# Patient Record
Sex: FEMALE | Race: WHITE
[De-identification: names, ages, dates, MRNs, and addresses within clinical notes are randomized per-mention and may not be internally consistent; named-entity substitution may affect disease eponyms.]

---

## 2018-02-06 NOTE — EDPHY
H & P


Time Seen by Provider: 02/06/18 15:46


HPI/ROS: 





HPI


Chest pain.





67-year-old female by private vehicle.  She comes from the office of her 

primary care physician Dr. Sandy Waggoner.  She was seen for chest pain which she 

states she developed with exertion about 2 weeks ago while cross-country 

skiing.  She reports that has been intermittent in nature and has been 

associated with exertion only up until several days ago when she noticed it 

just with walking.  She describes this discomfort as a burning pressure like 

sensation mid upper chest.  She has mild associated shortness of breath. Dr. Waggoner described to me some concerning EKG changes.  She has a negative troponin 

and negative D-dimer from her primary care physician's office.  Cardiac risk 

factors include hypertension and family history.  Her mother had heart disease.





ROS:





Constitutional:  No fever, no chills.  No weakness.


Eyes:  No discharge.  No changes in vision.


ENT:  No sore throat.  No nasal congestion or rhinorrhea.


Respiratory:  No cough.  No shortness of breath.


Cardiac:  No chest pain, no palpitations.


Gastrointestinal:  No abdominal pain, no vomiting, no diarrhea.


Genitourinary:  No hematuria.  No dysuria or increased frequency with urination.


Musculoskeletal:  No back pain.  No neck pain.  No myalgias or arthralgias.


Skin:  No rashes.


Neurological:  No headache.  No focal weakness or altered sensation.





Past medical history:  Hypertension.  She takes losartan for this.  As above.





Social history:  Former smoker.  Quit years ago.  Drinks alcohol socially.  

Here with her .





Physical Exam:





General Appearance:  Alert, no distress.  This patient is responding to 

questions appropriately and in full sentences.  This patient appears well-

hydrated and well-nourished.


Eyes:  Pupils equal and round no pallor or injection.  No lid edema, erythema 

or injection.


Respiratory:  There are no retractions, lungs are clear to auscultation with 

good air movement bilaterally.


Cardiovascular:  Regular rate and rhythm.  No murmur.


Gastrointestinal:  Abdomen is soft and nontender, no masses, bowel sounds 

normal.  No focal tenderness at McBurney's point.  No Gresham sign.


Neurological:  Motor sensory function is grossly intact.  Cranial nerves are 

normal.  Gait is normal.


Skin:  Warm and dry, no rashes.


Musculoskeletal:  Neck is supple and nontender.


Extremities are symmetrical.  All joints range without pain or impingement.


Psychiatric:  No agitation.  No depression.





Database:





EKG:





EKG time is 3:58 p.m.; EKG shows a narrow complex normal sinus rhythm with a 

ventricular rate of 51.  Premature atrial complex noted. The SD, QRS, QT 

intervals are within normal limits.  There are no ST-T wave changes indicative 

of ischemic or injury pattern.  No evidence of right heart strain.  Interpreted 

by me.





Imaging:





Chest x-ray AP portable; the cardiac mediastinal silhouette is unremarkable.  

No evidence of infiltrate or pneumothorax.  No acute cardiopulmonary disease 

process noted.  Interpreted by me.





Procedures:





Emergency department course:





Vital signs reviewed.  IV was placed.  EKG was repeated and reviewed by myself.

  She currently does not have any chest pain.  She was given 324 mg of chewed 

aspirin.  A chest x-ray will be obtained. Discussed plan for admission.  

Patient consents.





4:20 p.m., patient re-evaluated.  Resting comfortably at this time.  Results of 

EKG and chest x-ray discussed with her and her .  Plan for admission 

reviewed.  They endorse.





4:30 p.m., spoke with on-call hospitalist, Dr. Dos Santos.  Case discussed in 

detail.  He accepts this patient for admission.  Patient's remaining emergency 

department course under my care uneventful.  Patient admitted in stable 

condition to telemetry observation.





Differential Diagnosis:





The differential diagnosis on this patient includes but is not limited to 

exertional angina, acute coronary syndrome.  Myocardial infarction, pulmonary 

embolism, myocarditis, pericarditis, aortic dissection unlikely.  This 

represents a partial list of diagnoses considered.  These considerations are 

based on history, physical exam, past history, reassessment and diagnostic 

testing.


Smoking Status: Never smoked


Constitutional: 


 Initial Vital Signs











Temperature (C)  37.0 C   02/06/18 15:41


 


Heart Rate  58 L  02/06/18 15:41


 


Respiratory Rate  16   02/06/18 15:41


 


Blood Pressure  176/99 H  02/06/18 15:41


 


O2 Sat (%)  97   02/06/18 15:41








 











O2 Delivery Mode               Room Air














Allergies/Adverse Reactions: 


 





codeine Allergy (Mild, Verified 02/06/18 16:15)


 Vomiting








Home Medications: 














 Medication  Instructions  Recorded


 


Estradiol [Vivelle-Dot 0.075MG (*)] 0.075 mg TD SuWe@0800 02/06/18


 


Herbals/Supplements -Info Only 1 ea PO DAILY 02/06/18


 


Ibuprofen [Motrin (*)] 400 mg PO Q6H PRN 02/06/18


 


Losartan Potassium [Cozaar 25 mg 25 mg PO BID 02/06/18





(*)]  


 


Multivitamins [Multivitamin (*)] 1 each PO DAILY 02/06/18














Medical Decision Making





- Data Points


Medications Given: 


 








Discontinued Medications





Aspirin (Aspirin)  324 mg PO EDNOW ONE


   Stop: 02/06/18 15:49


   Last Admin: 02/06/18 16:02 Dose:  324 mg








Departure





- Departure


Disposition: [facility] Inpatient Acute


Clinical Impression: 


 Chest pain





Referrals: 


Sandy Waggoner MD [Primary Care Provider] - As per Instructions

## 2018-02-06 NOTE — CPEKG
Heart Rate: 51

RR Interval: 1176

P-R Interval: 164

QRSD Interval: 76

QT Interval: 416

QTC Interval: 384

P Axis: -11

QRS Axis: 31

T Wave Axis: 40

EKG Severity - BORDERLINE ECG -

EKG Impression: SINUS RHYTHM

EKG Impression: ATRIAL PREMATURE COMPLEX

EKG Impression: BORDERLINE T WAVE ABNORMALITIES

Electronically Signed By: McCollester, Laughlin 06-Feb-2018 23:02:19

## 2018-02-07 NOTE — CPR
[f rep st]



                                                  NONINVASIVE CARDIAC PROCEDURE REPORT





DATE OF PROCEDURE:  02/07/2018



PROCEDURE:  Exercise nuclear stress test.



INDICATION:  The patient is a 67-year-old female who developed burning chest discomfort while [place] 
[place] skiing up in [place] a few days ago.  She then had some discomfort with downhill skiing and also
 noted some with hiking the other day.  She has also had some exercise intolerance.  Her risk factors
 for coronary artery disease include hypertension.  She denies any history of hyperlipidemia, diabete
s, ongoing tobacco use, or significant family history of premature coronary artery disease.



PROCEDURE IN DETAIL:  Consent was obtained, and the patient was placed on continuous telemetry.  Her 
resting EKG revealed sinus bradycardia with a heart rate of 49 beats per minute.  She has T-wave flat
tening in the inferior leads as well as anterior lateral leads.  She exercised on the treadmill for 9
 minutes.  She developed burning chest discomfort in the 2nd stage of exercise, which persisted until
 recovery.  She had ST depression in the inferior leads of 2 mm as well as 1 mm of ST elevation in AV
R.  Her ST-T changes and improved but did not completely recover 5 minutes into recovery.  Her chest 
discomfort resolved within 30 minutes of recovery.  Her blood pressure at rest was 138/80 and increas
ed to 156/80.  Returned to baseline 5 minutes into recovery.



PLAN:  Positive exercise treadmill test.  I would have a low threshold to proceed with a coronary ang
iogram.





Job #:  [id number]

## 2018-02-07 NOTE — PDCARCONS
Cardiology Consult


Reason for Consult: Chest Pain.


Chief Complaint: Chest Pain.


Requesting Physician: Dr. Cabrera.


History of Present Illness: 


She is a pleasant 67-year-old female with no known cardiovascular disease.  She 

has a benign cardiac risk factor profile and her baseline is very healthy.  She 

has been experiencing symptoms of exertional chest discomfort now for 

approximately 2 weeks.  These initially began when she was [place] skiing up at 

[place].  At that time she developed a burning precordial chest discomfort when 

she was skiing on an uphill stretch.  This lasted for about 20 minutes.  At 

this point she reached the top of the hill, rested and her symptoms resolved.  

She was able to complete the rest of her route without difficulty.  2 days 

later she was at [facility] both scan with her  and she developed similar 

symptoms.  This occurred on a daily basis when she was downhill skiing for the 

3 day trip.  After this she went on vacation down to [place].  She noted that 

she had similar symptoms when she was walking briskly across the lobby of her 

hotel.  She also had another event when she went hiking with her friend.  She 

has had no resting symptoms.  She denies palpitations.  She has not had any 

dizziness or lightheadedness.  She has noted no edema. She has not had any fever

, chills or sweats and denies cough.  She has not had any hemoptysis.





As a result of these symptoms she went to see her primary care physician Dr. Sanches who advised her to come to the emergency department.  She was admitted 

to the hospital where she has been stable.  She underwent stress myocardial 

perfusion imaging today.  On the treadmill portion she experienced a very mild 

burning precordial chest sensation.  She also had 2 mm of ST depressions in the 

inferior leads.  Myocardial perfusion demonstrated reversible distal septal 

ischemia.





History Information





- Allergies/Home Medication List


Allergies/Adverse Reactions: 








codeine Allergy (Mild, Verified 02/06/18 16:15)


 Vomiting





Home Medications: 








Estradiol [Vivelle-Dot 0.075MG (*)] 0.075 mg TD SuWe@0800 02/06/18 [Last Taken 

02/04/18]


Herbals/Supplements -Info Only 1 ea PO DAILY 02/06/18 [Last Taken Unknown]


Ibuprofen [Motrin (*)] 400 mg PO Q6H PRN 02/06/18 [Last Taken Unknown]


Losartan Potassium [Cozaar 25 mg (*)] 25 mg PO BID 02/06/18 [Last Taken 02/06/18

]


Multivitamins [Multivitamin (*)] 1 each PO DAILY 02/06/18 [Last Taken 02/06/18]





I have personally reviewed and updated: family history, medical history, social 

history, surgical history


Past Medical History: 








- Social History


Smoking Status: Never smoked





Physical Exam


Physical Exam: 

















Temp Pulse Resp BP Pulse Ox


 


 36.8 C   65   16   120/52 L  94 


 


 02/07/18 04:28  02/07/18 04:28  02/07/18 04:28  02/07/18 04:28  02/07/18 04:28











Constitutional: no apparent distress


Eyes: PERRL


Ears, Nose, Mouth, Throat: moist mucous membranes


Cardiovascular: regular rate and rhythym, systolic murmur, No no murmur, rub, 

or gallop


Peripheral Pulses: 2+: carotid (R), carotid (L)


Respiratory: no respiratory distress, No clear to auscultation


Gastrointestinal: normoactive bowel sounds, soft, non-tender abdomen, No 

hepatosplenomegally


Skin: warm


Neurologic: AAOx3





Lab and Imaging





 02/07/18 03:28





 02/07/18 03:28














WBC  7.24 10^3/uL (3.80-9.50)   02/07/18  03:28    


 


RBC  3.66 10^6/uL (4.18-5.33)  L  02/07/18  03:28    


 


Hgb  12.7 g/dL (12.6-16.3)   02/07/18  03:28    


 


Hct  36.3 % (38.0-47.0)  L  02/07/18  03:28    


 


MCV  99.2 fL (81.5-99.8)   02/07/18  03:28    


 


MCH  34.7 pg (27.9-34.1)  H  02/07/18  03:28    


 


MCHC  35.0 g/dL (32.4-36.7)   02/07/18  03:28    


 


RDW  12.2 % (11.5-15.2)   02/07/18  03:28    


 


Plt Count  289 10^3/uL (150-400)   02/07/18  03:28    


 


MPV  9.3 fL (8.7-11.7)   02/07/18  03:28    


 


Neut % (Auto)  49.1 % (39.3-74.2)   02/07/18  03:28    


 


Lymph % (Auto)  36.5 % (15.0-45.0)   02/07/18  03:28    


 


Mono % (Auto)  10.2 % (4.5-13.0)   02/07/18  03:28    


 


Eos % (Auto)  2.9 % (0.6-7.6)   02/07/18  03:28    


 


Baso % (Auto)  1.0 % (0.3-1.7)   02/07/18  03:28    


 


Nucleat RBC Rel Count  0.0 % (0.0-0.2)   02/07/18  03:28    


 


Absolute Neuts (auto)  3.56 10^3/uL (1.70-6.50)   02/07/18  03:28    


 


Absolute Lymphs (auto)  2.64 10^3/uL (1.00-3.00)   02/07/18  03:28    


 


Absolute Monos (auto)  0.74 10^3/uL (0.30-0.80)   02/07/18  03:28    


 


Absolute Eos (auto)  0.21 10^3/uL (0.03-0.40)   02/07/18  03:28    


 


Absolute Basos (auto)  0.07 10^3/uL (0.02-0.10)   02/07/18  03:28    


 


Absolute Nucleated RBC  0.00 10^3/uL (0-0.01)   02/07/18  03:28    


 


Immature Gran %  0.3 % (0.0-1.1)   02/07/18  03:28    


 


Immature Gran #  0.02 10^3/uL (0.00-0.10)   02/07/18  03:28    


 


Sodium  141 mEq/L (135-145)   02/07/18  03:28    


 


Potassium  4.3 mEq/L (3.5-5.2)   02/07/18  03:28    


 


Chloride  105 mEq/L ()   02/07/18  03:28    


 


Carbon Dioxide  27 mEq/l (22-31)   02/07/18  03:28    


 


Anion Gap  9 mEq/L (8-16)   02/07/18  03:28    


 


BUN  12 mg/dL (7-23)   02/07/18  03:28    


 


Creatinine  0.7 mg/dL (0.6-1.0)   02/07/18  03:28    


 


Estimated GFR  > 60   02/07/18  03:28    


 


Glucose  80 mg/dL ()   02/07/18  03:28    


 


Hemoglobin A1c  5.5 % (4.0-6.0)   02/06/18  16:04    


 


Estim Average Glucose  111 mg/dL ()   02/06/18  16:04    


 


Calcium  8.9 mg/dL (8.5-10.4)   02/07/18  03:28    


 


Magnesium  2.2 mg/dL (1.6-2.3)   02/07/18  03:28    


 


Total Bilirubin  0.6 mg/dL (0.1-1.4)   02/07/18  03:28    


 


AST  18 IU/L (14-46)   02/07/18  03:28    


 


ALT  30 IU/L (9-52)   02/07/18  03:28    


 


Alkaline Phosphatase  41 IU/L ()   02/07/18  03:28    


 


Troponin I  < 0.012 ng/mL (0.000-0.034)   02/07/18  03:28    


 


Total Protein  6.2 g/dL (6.3-8.2)  L  02/07/18  03:28    


 


Albumin  3.7 g/dL (3.5-5.0)   02/07/18  03:28    


 


Triglycerides  68 mg/dL ()   02/07/18  03:28    


 


Cholesterol  182 mg/dL (140-220)   02/07/18  03:28    


 


Cholesterol Risk Factr  0.4  (0.2-1.0)   02/07/18  03:28    


 


LDL Cholesterol, Calc  93 mg/dL ()   02/07/18  03:28    


 


LDL Risk Factor  0.5  (0.2-1.0)   02/07/18  03:28    


 


VLDL Cholesterol  13 mg/dL (8-25)   02/07/18  03:28    


 


Non-HDL Cholesterol  106 mg/dL ()   02/07/18  03:28    


 


HDL Cholesterol  76 mg/dL (40-85)   02/07/18  03:28    


 


LDL/HDL Ratio  1.22 RATIO (1.00-3.22)   02/07/18  03:28    


 


Cholesterol/HDL Ratio  2.39 RATIO (1.00-4.44)   02/07/18  03:28    


 


TSH  3.700 uIU/mL (0.465-4.680)   02/07/18  03:28    


 


Hepatitis A IgM Ab  NEGATIVE  (NEGATIVE)   02/07/18  03:28    


 


Hep Bs Antigen  NEGATIVE  (NEGATIVE)   02/07/18  03:28    


 


Hep B Core IgM Ab  NEGATIVE  (NEGATIVE)   02/07/18  03:28    


 


Hepatitis C Antibody  NEGATIVE  (NEGATIVE)   02/07/18  03:28    








Visualized and Interpreted Chest x-ray results: Yes


Visualized and Interpreted EKG results: Yes


EKG Interpretation: Positive for: normal sinsus rhythm


Telemetry: Normal sinus rhythm.





A/P


Assessment: 


67-year-old female who, at her baseline, is very healthy and very active who 

over the last 2 weeks has manifested symptoms of crescendo angina.  Her stress 

myocardial perfusion imaging indicates reversible ischemia in the distribution 

of the left anterior descending currently, her symptoms are low level.  Her 

stress myocardial perfusion imaging is consistent with an intermediate risk 

profile.  As result of her myocardial perfusion imaging study and the degree of 

symptomatology was recommended that she undergo diagnostic coronary angiography 

tomorrow.  The risks, benefits and alternatives were discussed with her today.  

She agrees to proceed.  Further recommendations will be made pending the 

results of that study.

## 2018-02-07 NOTE — HOSPPROG
Hospitalist Progress Note


Assessment/Plan: 





#Chest pressure: nuc med show septal ischemia. Plan for cath in morning. Check 

lipids


-cont telemetry





#Diet: NPO after MN





#Disp: warrants inpt admission for cardiac cath


Subjective: no chest pressure today


Objective: 


 Vital Signs











Temp Pulse Resp BP Pulse Ox


 


 37.2 C   66   13   140/65 H  95 


 


 02/07/18 16:34  02/07/18 16:34  02/07/18 16:34  02/07/18 16:34  02/07/18 16:34








 Laboratory Results





 02/07/18 03:28 





 02/07/18 03:28 





 











 02/06/18 02/07/18 02/08/18





 05:59 05:59 05:59


 


Intake Total  0 


 


Balance  0 














- Physical Exam


Constitutional: no apparent distress


Eyes: PERRL


Ears, Nose, Mouth, Throat: moist mucous membranes


Cardiovascular: regular rate and rhythym, no murmur, rub, or gallop


Respiratory: no respiratory distress, no rales or rhonchi


Gastrointestinal: normoactive bowel sounds, soft, non-tender abdomen


Musculoskeletal: full muscle strength


Neurologic: AAOx3, CN II-XII Intact


Psychiatric: interacting appropriately





ICD10 Worksheet


Patient Problems: 


 Problems











Problem Status Onset


 


Chest pain Acute

## 2018-02-07 NOTE — ASMTCASEMG
Living Arrangements

 

What is your living           Answers:  With Spouse                           

arrangement? Who do you                                                       

live with?                                                                    

Type Of Residence

 

What kind of residence do     Answers:  House                                 

you live in?                                                                  

Discharge Plan Comments

 

Coordination Status           

Comments                      

Notes:

Pt is a 66 y/o female admitted for chest pain. Pt will most likely d/c independent if her stress 

test comes back negative. No therapies ordered at this time. CM available for changes.







Plan: Independent 

 

Date Signed:  02/07/2018 09:08 AM

Electronically Signed By:RAMONE Gudino

## 2018-02-08 NOTE — PDMN
Medical Necessity


Medical necessity: change to IP; los>2mn for ongoing eval and rx for chest 

pressure , with nuc med test positive for septal ischemia; requires cardiac 

cath and continued telemetry; per order and progress note 2/7/18

## 2018-02-08 NOTE — HOSPPROG
Hospitalist Progress Note


Assessment/Plan: 





#Chest pressure: nuc med show septal ischemia. 


-mid-LAD lesion stented. Effient, ASA 325mg, statin. No BB with bradycardia


-cont telemetry





#Diet: cardiac





#Disp: warrants inpt admission tonight for telemetry, can likely DC in morning


Subjective: no CP


Objective: 


 Vital Signs











Temp Pulse Resp BP Pulse Ox


 


 36.9 C   54 L  16   117/68   93 


 


 02/08/18 07:06  02/08/18 07:06  02/08/18 07:06  02/08/18 07:06  02/08/18 07:06








 Laboratory Results





 02/08/18 03:19 





 02/08/18 03:19 





 











 02/07/18 02/08/18 02/09/18





 05:59 05:59 05:59


 


Intake Total  740 


 


Balance  740 








 











PT  13.6 SEC (12.0-15.0)   02/08/18  03:19    


 


INR  1.02  (0.83-1.16)   02/08/18  03:19    














- Physical Exam


Constitutional: no apparent distress


Ears, Nose, Mouth, Throat: moist mucous membranes


Cardiovascular: regular rate and rhythym, No edema


Respiratory: no respiratory distress


Gastrointestinal: normoactive bowel sounds


Genitourinary: no bladder fullness


Skin: warm


Musculoskeletal: full muscle strength, other (radial cath site with good pulse, 

no hematoma)


Neurologic: AAOx3


Psychiatric: interacting appropriately





ICD10 Worksheet


Patient Problems: 


 Problems











Problem Status Onset


 


Chest pain Acute

## 2018-02-08 NOTE — CPEKG
Heart Rate: 46

RR Interval: 1304

P-R Interval: 180

QRSD Interval: 74

QT Interval: 452

QTC Interval: 396

P Axis: 18

QRS Axis: 33

T Wave Axis: 55

EKG Severity - BORDERLINE ECG -

EKG Impression: SINUS BRADYCARDIA

EKG Impression: BORDERLINE T ABNORMALITIES, ANT-LAT LEADS

Electronically Signed By: Presley Marin 10-Feb-2018 08:25:26

## 2018-02-08 NOTE — SOAPPROG
SOAP Progress Note


Assessment/Plan: 


Assessment:


67-year-old female with minimal cardiovascular risk factors presents with 

classic symptoms of crescendo angina.  Myocardial perfusion imaging suggests 

LAD territory ischemia.  She really has very little blood pressure and heart 

rate that would allow aggressive antianginal therapy.  Additionally, her study 

is clearly intermediate risk.  Therefore, we will proceed with coronary 

angiography for definitive diagnosis.  Further recommendations will be made 

following that study.





02/08/18 15:20





Subjective: 


She is doing well today.  She did not have any chest discomfort overnight.  

Cardiac enzymes remain negative.


Objective: 





 Vital Signs











Temp Pulse Resp BP Pulse Ox


 


 36.9 C   54 L  16   117/68   93 


 


 02/08/18 07:06  02/08/18 07:06  02/08/18 07:06  02/08/18 07:06  02/08/18 07:06








 Laboratory Results





 02/08/18 03:19 





 02/08/18 03:19 





 











 02/07/18 02/08/18 02/09/18





 05:59 05:59 05:59


 


Intake Total  740 


 


Balance  740 








 











PT  13.6 SEC (12.0-15.0)   02/08/18  03:19    


 


INR  1.02  (0.83-1.16)   02/08/18  03:19    














Physical Exam





- Physical Exam


General Appearance: WD/WN, no apparent distress


Neck: non-tender, full range of motion


Respiratory: lungs clear, No crackles, No rales, No rhonchi


Cardiac/Chest: regular rate, rhythm, No edema, No gallop, No JVD


Peripheral Pulses: 2+: carotid (R), carotid (L)


Abdomen: non-tender, soft


Pelvic Exam: deferred


Rectal: deferred


Neuro/Psych: alert, oriented x 3





ICD10 Worksheet


Patient Problems: 


 Problems











Problem Status Onset


 


Chest pain Acute

## 2018-02-08 NOTE — PDCTREPORT
Cardiothoracic Procedure Rpt


Cardiothoracic Procedure Report: 


Procedure:  Primary stenting of the proximal LAD followed by intravascular 

ultrasound and assessment of left main stenosis by fractional flow reserve.


Indications:  Critical proximal LAD disease with crescendo angina and abnormal 

treadmill.


Narrative:  After reviewing diagnostic angiograms by my partner Dr. Negrete was 

elected to proceed with PCI.  Patient was anticoagulated with heparin.  

Attempts at accessing the LAD from the radial artery were made but the wire and 

catheter would not advance be on the mid forearm.  In light of critical 

stenosis of the LAD the radial artery access site was abandoned.  A 6 Chadian 

sheath was inserted in the right femoral artery.  Using a 6 Chadian EBU 3.5 

guiding catheter left main coronary selectively intubated.  Using a 0.014 

intuition wire the LAD stenosis was crossed and a wire placed in the distal 

vessel.  A 3.0 x 24 mm synergy stent was placed across the lesion.  Position 

confirmed in two views and deployed using a single inflation.  Repeat angiogram 

showed step-up step-down.  Distal to the stent a myocardial bridge became more 

apparent.  Patient was administered intracoronary nitroglycerin.  The wire was 

withdrawn revealing a mid LAD myocardial bridge.  I want to confirm this 

diagnosis as well as reassess LAD stent size.  Intravascular ultrasound was 

performed with slow pullback confirming a mid LAD bridge.  The stent was well 

apposed.  Proximal to the stent was significant soft atheroma seen with what 

appears to be a necrotic core.  This plaque extended back into the left main 

giving us a 68% by Q CA stenosis of the distal left main.  The area was 6.8 

mm2.  Films were reviewed with my partner Dr. Negrete and Dr. Holder.  We 

proceeded with fractional flow reserve assessment of the left main and LAD.  

After maximal hyperemia achieved with intravenous adenosine FFR was 0.93.  

Wires were withdrawn.  Final orthogonal angiograms were obtained.  The patient 

is taken to recovery for continued care.  Arteriotomy will be closed with an 

Angio-Seal.  The radial artery arteriotomy will be closed with a TR band.  

Patient be loaded with Effient as a antiplatelet agent.  High-dose statin 

therapy Ace inhibition beta-blockade with close clinical follow-up.


Conclusions:  Unstable angina status post successful PCI and stenting of the 

proximal LAD.  Residual soft atheroma involving the proximal and ostial LAD and 

left main coronary artery.  Myocardial bridge.  Results discussed with patient 

and family.





Patient Problems: 


 Problems











Problem Status Onset


 


Chest pain Acute

## 2018-02-09 NOTE — CPEKG
Heart Rate: 61

RR Interval: 984

P-R Interval: 160

QRSD Interval: 76

QT Interval: 376

QTC Interval: 379

P Axis: 23

QRS Axis: 26

T Wave Axis: 51

EKG Severity - BORDERLINE ECG -

EKG Impression: SINUS RHYTHM

EKG Impression: BORDERLINE T ABNORMALITIES, ANT-LAT LEADS

Electronically Signed By: Presley Marin 10-Feb-2018 08:25:36

## 2018-02-09 NOTE — ASMTLACE
VIET

 

Comorbidities - select        Answers:  Other                         Notes:  hypertension

all that apply                                                                

# of Emergency department     Answers:  0                                     

visits in the last 6                                                          

months                                                                        

Score: 1

 

Date Signed:  02/09/2018 11:44 AM

Electronically Signed By:Ines Barrientos RN

## 2018-02-09 NOTE — ASDISCHSUM
----------------------------------------------

Discharge Information

----------------------------------------------

Plan Status:Home with No Needs                       Medically Cleared to Leave:02/08/2018

Discharge Date:02/08/2018                            CM D/C Disposition:Home, Routine, Self-Care

ADT D/C Disposition:Home, Routine, Self-Care         Projected Discharge Date:02/08/2018

Transportation at D/C:                               Discharge Delay Reason:

Follow-Up Date:02/08/2018                            Discharge Slot:

Final Diagnosis:

----------------------------------------------

Placement Information

----------------------------------------------

----------------------------------------------

Patient Contact Information

----------------------------------------------

Contact Name:VI                              Relationship:

Address:[address]                             Home [phone]

                                                     Work [phone]

City:[place]                                         Alternate Phone:

[facility]/Zip Code:[place] [address]                              Email:

----------------------------------------------

Financial Information

----------------------------------------------

Financial Class:Medicare

Primary Plan Desc:MEDICARE INPATIENT                 Primary Plan Number:[id number]

Secondary Plan Desc:SUHAIL BEDOYA O                    Secondary Plan Number:[id number]

 

 

----------------------------------------------

Assessment Information

----------------------------------------------

----------------------------------------------

[facility] Initial CM Assessment

----------------------------------------------

Living Arrangements

 

What is your living           Answers:  With Spouse                           

arrangement? Who do you                                                       

live with?                                                                    

Type Of Residence

 

What kind of residence do     Answers:  House                                 

you live in?                                                                  

Discharge Plan Comments

 

Coordination Status           

Comments                      

Notes:

Pt is a 68 y/o female admitted for chest pain. Pt will most likely d/c independent if her stress 

test comes back negative. No therapies ordered at this time. CM available for changes.







Plan: Independent 

 

Date Signed:  02/07/2018 09:08 AM

Electronically Signed By:RAMONE Gudino

 

 

----------------------------------------------

LACE

----------------------------------------------

LACE

 

Comorbidities - select        Answers:  Other                         Notes:  hypertension

all that apply                                                                

# of Emergency department     Answers:  0                                     

visits in the last 6                                                          

months                                                                        

Score: 1

 

Date Signed:  02/09/2018 11:44 AM

Electronically Signed By:Ines Barrientos RN

 

 

----------------------------------------------

Intervention Information

----------------------------------------------

Intervention Type:*ADAME-Signed                       Date of Service:02/07/2018 10:02 AM

Patient Type:Observation                             Staff Member:Nesha Romero

Hours:                                               Discipline:

Severity:                                            Comment:

## 2018-02-09 NOTE — PDCARPN
Cardiology Progress Note


Assessment/Plan: 


Assessment:


She is doing well following PCI/ stenting of her mid LAD .  She does have 

fairly extensive plaque noted on intravascular ultrasound specifically at the 

ostium of the LAD and distal left main.  She was further evaluated with 

fractional flow reserve without any indication of obstructive disease.  This is

, however, concerning  And certainly warrants very aggressive medical therapy.





She will continue with dual anti-platelet therapy for the next year.  She will 

also be treated with very aggressive antihyperlipidemic therapy.  She was 

started on Lipitor 80 mg which he will take before bed.  At this point I think 

she can gradually start to ramp up her activity level.  She has been enrolled 

in cardiac rehab.   She is stable for discharge. I would like her to follow up 

with me within the next 2 weeks.


Subjective: 


She is doing well today.  She has not had any chest discomfort.  She has 

minimal pain at the right radial access site and right groin access site.  On 

telemetry she has been bradycardic.  There are periods of time where she may 

have a junctional rhythm.  She has, however, not experienced any significant 

symptoms.


Objective: 





 Vital Signs (8 Hrs)











  Temp Pulse Resp BP Pulse Ox


 


 02/09/18 08:46  36.9 C  65  14  135/83 H  96


 


 02/09/18 05:00  37.0 C  59 L  18  127/68 H  95








 Intake/Output (24 Hrs)











 02/08/18 02/09/18 02/10/18





 05:59 05:59 05:59


 


Intake Total 740 1640 


 


Balance 740 1640 


 


Intake:   


 


  Oral (ml) 740 1640 


 


  IV Intake (ml) 0  


 


Other:   


 


  Weight  62.1 kg 62.1 kg


 


  Intake Quantity npo Yes 





  Sufficient   


 


  Number of Voids   


 


    Toilet 1 2 2


 


  Number of Stools   


 


    Toilet   2











Result Diagrams: 


 02/09/18 06:19





 02/09/18 06:19





- Physical Exam


Constitutional: WDWN


Ears, Nose, Mouth, Throat: moist mucous membranes


Cardiovascular: regular rate and rhythm, no murmurs, no rubs, no gallops, other 

(Right radial and right groin access sites are intact with minimal ecchymoses, 

she has no right femoral arterial bruit)


Peripheral Pulses: 2+: carotid (R), carotid (L)


Respiratory: clear to auscultate bilat


Neurologic: AAOx3





ICD10 Worksheet


Patient Problems: 


 Problems











Problem Status Onset


 


Chest pain Acute

## 2018-02-09 NOTE — PDDCSUM
Discharge Summary


Discharge Summary: 


66 Yo female admitted with crescendo angina. Had Stent placed to proximal LAD. 

Large plaque at ostrum of LAD. Cards has recommended aggressive medical mgmt.


Had some bradycardia and therefore BB not started


Start Atorvastatin 80mg daily


Aspirin 325mg daily


Effient


Coozar


f/u with Cards





DDX:





#Chest pressure: nuc med show septal ischemia. 


-mid-LAD lesion stented. Effient, ASA 325mg, statin, ARB. No BB with bradycardia








Exam:


NAD


AAOX3


RRR


CTA B


S/NT/ND





MEDS: SEE MED REC





F/U: WITH CARDS





TOTAL TIME SPENT ON DC INCLUDING BEDSIDE ROUNDS WITH NURSE IS 35 MINS

## 2018-12-08 ENCOUNTER — HOSPITAL ENCOUNTER (INPATIENT)
Dept: HOSPITAL 80 - FED | Age: 68
LOS: 8 days | Discharge: HOME | DRG: 234 | End: 2018-12-16
Attending: THORACIC SURGERY (CARDIOTHORACIC VASCULAR SURGERY) | Admitting: INTERNAL MEDICINE
Payer: COMMERCIAL

## 2018-12-08 DIAGNOSIS — I10: ICD-10-CM

## 2018-12-08 DIAGNOSIS — D62: ICD-10-CM

## 2018-12-08 DIAGNOSIS — G89.29: ICD-10-CM

## 2018-12-08 DIAGNOSIS — I48.0: ICD-10-CM

## 2018-12-08 DIAGNOSIS — I25.110: Primary | ICD-10-CM

## 2018-12-08 LAB
INR PPP: 0.99 (ref 0.83–1.16)
PLATELET # BLD: 354 10^3/UL (ref 150–400)
PROTHROMBIN TIME: 13.3 SEC (ref 12–15)

## 2018-12-08 PROCEDURE — C1768 GRAFT, VASCULAR: HCPCS

## 2018-12-08 PROCEDURE — G0378 HOSPITAL OBSERVATION PER HR: HCPCS

## 2018-12-08 PROCEDURE — P9041 ALBUMIN (HUMAN),5%, 50ML: HCPCS

## 2018-12-08 PROCEDURE — C1760 CLOSURE DEV, VASC: HCPCS

## 2018-12-08 RX ADMIN — POLYETHYLENE GLYCOL 3350 PRN GM: 17 POWDER, FOR SOLUTION ORAL at 20:47

## 2018-12-08 RX ADMIN — LOSARTAN POTASSIUM SCH MG: 25 TABLET, FILM COATED ORAL at 20:38

## 2018-12-08 RX ADMIN — PRAVASTATIN SODIUM SCH MG: 10 TABLET ORAL at 20:38

## 2018-12-08 NOTE — EDPHY
H & P


Time Seen by Provider: 12/08/18 12:58


HPI/ROS: 





CHIEF COMPLAINT:  Chest pain





HISTORY OF PRESENT ILLNESS:  The patient is a 68-year-old female with a history 

of coronary artery disease and stent placement in February 2018. Patient states 

she had surgery for bladder suspension approximately 5 weeks ago.  She has 

slowly increased her activity.  Last night she was walking up a [place] to a show 

at [facility].  She developed substernal chest burning and 

pressure.  She stopped in improved.  She had to stop 2 more times while walking 

up the hill.  Patient denies any chest pain at this time.  No shortness of 

breath.  No cough.  No leg pain or swelling.





REVIEW OF SYSTEMS:  


10 systems were reveiwed and are negative with the exception of the elements 

mentioned in the history of present illness.


Past Medical/Surgical History: 





Includes coronary artery disease, esophageal spasm, hypertension, bladder 

prolapse





Past surgical history:  Includes bladder suspension





Social history:  Patient does not smoke


Smoking Status: Never smoked


Physical Exam: 





Vitals noted


GENERAL:  Well-appearing, in no acute distress, alert.


HEENT:  Eyes normal to inspection, normal pharynx, no signs of dehydration.


NECK:  Normal, supple.


RESPIRATORY:  Clear to auscultation bilaterally, no rales, rhonchi or wheezing.


CVS:  Regular rate and rhythm, no rubs, murmurs, or gallops.


ABDOMEN:  Soft, nontender, nondistended, no organomegaly.


BACK:  Normal to inspection, no CVA tenderness.


SKIN:  Normal color, no rash, warm, dry.  No pallor.


EXTREMITIES:  No pedal edema, no calf tenderness, no Homans sign or cords, no 

joint swelling.


NEURO/PSYCH:  Alert and oriented, normal mood and affect, normal motor sensory 

exam. 


Constitutional: 





 Initial Vital Signs











Temperature (C)  36.8 C   12/08/18 12:49


 


Heart Rate  65   12/08/18 12:49


 


Respiratory Rate  18   12/08/18 12:49


 


Blood Pressure  141/79 H  12/08/18 12:49


 


O2 Sat (%)  98   12/08/18 12:49








 











O2 Delivery Mode               Room Air














Allergies/Adverse Reactions: 


 





codeine Allergy (Mild, Verified 02/06/18 16:15)


 Vomiting


adhesive Allergy (Verified 12/08/18 12:48)


 








Home Medications: 














 Medication  Instructions  Recorded


 


Estradiol [Vivelle-Dot 0.075MG (*)] 0.075 mg TD SuWe@0800 02/06/18


 


Herbals/Supplements -Info Only 1 ea PO DAILY 02/06/18


 


Losartan Potassium [Cozaar 25 mg 25 mg PO BID 02/06/18





(*)]  


 


Multivitamins [Multivitamin (*)] 1 each PO DAILY 02/06/18


 


Aspirin EC [Aspirin  mg (*)] 325 mg PO DAILY #30 tab 02/09/18














Medical Decision Making


ED Course/Re-evaluation: 





In the emergency department I discussed possible etiologies with the patient 

and .  I answered all her questions.  IV was placed.  Laboratory studies

, EKG and chest x-ray were ordered.  Patient was given aspirin 324 mg orally.





EKG shows normal sinus rhythm, normal rate, normal axis, normal intervals.  

There are no ST or T-wave abnormalities.


EKG is normal as interpreted by me.


Differential Diagnosis: 





My differential includes but is not limited to ACS, acute MI, PE, pericarditis, 

myocarditis, esophageal spasm, GERD, peptic ulcer disease





Departure





- Departure


Disposition: [facility] Inpatient Acute


Clinical Impression: 


Chest pain


Qualifiers:


 Chest pain type: unspecified Qualified Code(s): R07.9 - Chest pain, unspecified





Condition: Good


Referrals: 


Sandy Waggoner MD [Primary Care Provider] - As per Instructions

## 2018-12-08 NOTE — GHP
DATE OF ADMISSION:  12/08/2018



CHIEF COMPLAINT:  Chest pain.



HISTORY OF PRESENT ILLNESS:  This is a 68-year-old female with a history of coronary artery disease s
tatus post percutaneous coronary intervention in February of this year who presents with chest pain. 
 This occurred last night when she was walking up the hill to go to a show at [facility].  Desc
ribed as a burning substernal, not associated with any shortness of breath, not radiating.  This is e
xactly like the pain she experienced that led her to get her previous stent.  She has noticed this pa
in occasionally now.  It does seem to be exertional.  It does resolve with rest.  Notably, she was ad
mitted here in February, at which point she had a stent placed to her mid LAD.  Also noted was a dist
al left main lesion as well as an ostial LAD lesion, which were not intervened upon after FFR.  She h
as been maintained on medical therapy.  She follows with Dr. Negrete, who stopped her Effient last Augu
st after she noted severe bruising.  She is compliant with aspirin at this time.  She has not tolerat
ed a beta blocker due to bradycardia.



PAST MEDICAL/SURGICAL HISTORY:  

1.  Coronary artery disease.

2.  Recent bladder suspension.

3.  Chronic low back pain.



MEDICATIONS:  Please see medication reconciliation.



ALLERGIES:  Adhesive tape.  Codeine, which produces vomiting, though is not a real allergy.



FAMILY HISTORY:  Her mother had a permanent pacemaker, lived to her 90s.



SOCIAL HISTORY:  She quit smoking over 40 years ago.  She drinks wine occasionally.



REVIEW OF SYSTEMS:  10-point review of systems is conducted and is negative except per HPI.



PHYSICAL EXAM:  VITAL SIGNS:  Blood pressure 137/85, heart rate 59, respiration rate 16, satting at 9
6% on room air.  Temperature is 36.8.  GENERAL:  The patient is a very pleasant female who is resting
 comfortably in no acute distress.  HEENT:  Normocephalic, atraumatic.  CARDIOVASCULAR:  Regular rate
 and rhythm.  No murmurs, rubs, or gallops.  No elevated JVD.  No lower extremity edema.  PULMONARY: 
 Shows her to be breathing comfortably.  Lungs are clear to auscultation bilaterally.  ABDOMEN:  Soft
, nontender, nondistended.  SKIN:  Shows no rash.  :  No Masters.  NEUROLOGIC:  Alert and oriented x3
.  She is moving all extremities.  PSYCHIATRIC:  Normal mood and affect.



LABS:  White count is 9.8, INR 0.99.  Basic metabolic panel is normal.  Troponin is negative.



DATA:  

1.  Discussed with Dr. Marni, and he will consult.

2.  Chest x-ray, which I personally viewed and interpreted, shows normal heart size.  There is nothin
g acute on here.

3.  EKG, which I personally viewed and interpreted, shows sinus rhythm.  It is nonischemic.



IMPRESSION AND PLAN:  Chest pain/coronary artery disease:  Concerning for recurrent angina.  She did 
have previous abnormalities on her last catheterization which were not intervened upon after fraction
al flow reserve.  I have discussed all this with Dr. Marin.  He will evaluate her films and consider
 catheterization versus Cardiothoracic Surgery consult.  We will place her on progressive care unit, 
monitor on telemetry, get 2 more sets of cardiac enzymes.  This is a high-risk diagnosis.  Will fara
nue her cardiac medications.





Job #:  [id number]

## 2018-12-08 NOTE — CPEKG
Test Reason : OPEN

Blood Pressure : ***/*** mmHG

Vent. Rate : 061 BPM     Atrial Rate : 061 BPM

   P-R Int : 159 ms          QRS Dur : 076 ms

    QT Int : 416 ms       P-R-T Axes : -08 012 037 degrees

   QTc Int : 419 ms

 

Sinus rhythm

 

Confirmed by Tarik Engel ([id number]) on 12/8/2018 1:26:05 PM

 

Referred By:             Confirmed By:Tarik Engel

## 2018-12-09 RX ADMIN — ASPIRIN SCH MG: 81 TABLET, DELAYED RELEASE ORAL at 08:29

## 2018-12-09 RX ADMIN — LOSARTAN POTASSIUM SCH MG: 25 TABLET, FILM COATED ORAL at 20:04

## 2018-12-09 RX ADMIN — ESTRADIOL SCH MG: 0.07 PATCH, EXTENDED RELEASE TRANSDERMAL at 08:29

## 2018-12-09 RX ADMIN — POLYETHYLENE GLYCOL 3350 PRN GM: 17 POWDER, FOR SOLUTION ORAL at 20:06

## 2018-12-09 RX ADMIN — LOSARTAN POTASSIUM SCH MG: 25 TABLET, FILM COATED ORAL at 08:28

## 2018-12-09 RX ADMIN — PRAVASTATIN SODIUM SCH MG: 10 TABLET ORAL at 20:04

## 2018-12-09 RX ADMIN — ACETAMINOPHEN PRN MG: 325 TABLET ORAL at 17:00

## 2018-12-09 NOTE — PDDXCAT
Diagnostic Cath Note





- .


Date: 12/09/18


: Tomas


Indication: CCC Class III and IV angina on medical treatment





- Procedure


Access: right groin


Procedure: left heart catheterization, coronary angiography, left ventriculogram





- Materials


Left Heart Cath size: 6F


Left Heart Cath materials: standard multipack (JL4, JR4, pigtail)





- Findings-Left Heart Catheterization


LM: Medium length vessel with a "napkin ring" focal stenosis in the mid portion 

of the vessel >70% with haziness.  Bifurcation into the LAD and LCX vessels


LAD: Medium diameter vessel with mild ostial disease (30%) and moderate in 

stent stenosis (50%).  The first diagonal (D1, principal) continues have ostial 

disease (80%).  Moderate tortuosity to remainder of the LAD.


LCX: Medium diameter vessel with several smallish obtuse marginals.  Tortuosity 

was noted from mid vessels to distal.  No luminal irregularities were noted.


RCA: Medium diameter vessel with supply to the PDA.  No luminal irregularities 

were noted.


EDP: 20 mm Hg


LVEF: >65%


Wall motion: normal


Complications: none


Estimated blood loss: <50ml


Closure method: Angioseal


Assessment: Patient is a 67 y/o female with history of CAD s/p PCI to the pLAD 

in Feb 2018, with return of similar symptoms to pre PCI.  Angiography today 

with critical mid Left Main disease as well as moderate to severe mid in stent 

stenosis to the LAD.  Normal LVEF.


Plan: 





CT surgery consult


Intervention: 





none


Patient Problems: 


 Problems











Problem Status Onset


 


Chest pain Acute

## 2018-12-09 NOTE — ASMTCMCOM
CM Note

 

CM Note                       

Notes:

Patient admitted via ED with c/o chest pain. Known CAD. To cath lab today. CM to follow for 

needs. Likely to dc independent when medically cleared for discharge.



Plan: TBD

 

Date Signed:  12/09/2018 10:56 AM

Electronically Signed By:Faby Grajeda RN

## 2018-12-09 NOTE — PDMN
Medical Necessity


Medical necessity: [facility] Cardiovascular Surgery or Procedure GRG 69 yo s/p 

PCI Feb 2018 presents w/ CP.  Initially OBS for cardiac workup.  Cardiology 

consulted, concerning for recurrent angina. Urgent heart cath reveals critical 

mid Left Main disease as well as moderate to severe mid in stent stenosis to 

the LAD. Cardiothoracic surgery consult ordered. Pt requires additional MN for 

CT consult, potential CT surgery.  Hx CAD, recent bladder suspension and 

chronic low back pain.  Change to IP status 12/9/18 @6884 per MD order

## 2018-12-09 NOTE — GCON
DATE OF CONSULTATION:  12/09/2018



REFERRING PHYSICIAN:  Presley Marin MD



Patient seen at the request of Dr. Marin with the patient's permission.



IMPRESSION:  

1.  Unstable angina pectoris with left main and proximal left anterior descending in-stent stenosis. 


2.  Status post recent bladder suspension. 

3.  Chronic low back pain.



RECOMMENDATIONS:  This patient is to stay in hospital and undergo coronary artery revascularization. 
 We have tentatively scheduled her for Tuesday mid morning.  Certainly, if she develops further chest
 pain unresponsive to medical therapy, we would proceed with surgery more urgently.  Risks and compli
cations, including bleeding, infection, stroke, heart attack, and death, all less than 1%, were revie
wed with the patient and her .  She agrees to stay for surgery.



CHIEF COMPLAINT:  Chest pain.



HISTORY OF CHIEF COMPLAINT:  This is a 68-year-old, very active lady, underwent stenting to her LAD i
n February.  She had recurrence of symptoms with severe chest pain the night before admission, was ad
mitted and underwent diagnostic cath which showed ostial LAD and left main stenosis, high-grade with 
normal LV function.



MEDICATIONS:  Please see medication reconciliation.



ALLERGIES:  Adhesive tape, codeine which causes vomiting but not truly allergic.



FAMILY HISTORY:  Noncontributory.



SOCIAL HISTORY:  She quit smoking over 40 years ago.  She drinks wine occasionally.  She is active, s
kis, bikes and runs.  She is a retired teacher.  Her  was present and accompanied her.



REVIEW OF SYSTEMS:  All 10-point systems were conducted and were negative except for HPI.



PHYSICAL EXAMINATION:  VITAL SIGNS:  Blood pressure 130/76, respirations 14.  HEENT:  Normocephalic, 
PERRLA, EOMI.  NECK:  Without bruit, adenopathy, or thyromegaly.  HEART:  Rate regular without murmur
, S3, or S4.  LUNGS:  Clear.  ABDOMEN:  Soft, nontender.  Bowel sounds are active.  RECTAL AND GENITA
L:  Deferred.  NEUROLOGIC:  She is grossly intact.





Job #:  [id number]

## 2018-12-09 NOTE — PDCARPN
Cardiology Progress Note


Chief Complaint: 





chest discomfort





Assessment/Plan: 


Assessment:


Patient is a 69 y/o female with history of HLP (on therapy), HTN, and CAD s/p 

PCI to the proximal LAD in Feb 2018, who presents to [facility] ER with complaints of 

chest discomfort with ambulation.  Patient had been noting some degree of "

chest burning" over the past several weeks, but not to a degree that was 

overtly concerning to the patient.  Her son-in-law is a nurse in [place] 

[place], and he had voiced some concerns about the symptoms given her history.

  On Friday, while walking rapidly up hill to [place] for a [employer], 

the patient noted severe substernal chest pains to the degree that she had to 

stop walking several times.  No radiation into shoulder, neck, or jaw.  Similar 

location to symptoms that were noted in Feb 2018 prior to LAD PCI, but more 

severe this time.  Compliance with medical therapy has been good, but Effient 

was stopped after 6 months given (a) pending surgery and (b) severe bruising.  

Ongoing use of ARB, statin, and low dose ASA therapy.  Overnight, no symptoms 

were noted.  No cardiac biomarker elevation has been noted, but ST/T wave 

changes are noted on telemetry (ST depression).  Patient was told that there 

was mild residual proximal LAD stenosis noted (proximal to the PCI) at the 

conclusion of her Feb intervention.  Review of the films with ~20% stenosis 

appreciated.





Plan:


(1)  Would proceed with angiography this morning


(2)  Continue therapy on ASA for life


(3)  Statins should continue as at present


 - review of labs with good LDL control noted


(4)  Should there be in stent stenosis, would recommend lifelong antiplatelet 

therapy


(5)  Further recommendations after diagnostic angiography has been completed








Subjective: 





No active cardiovascular complaints this morning





Reviewed/Discussed With: hospitalist


Objective: 





 Vital Signs (8 Hrs)











  Temp Pulse Resp BP Pulse Ox


 


 12/09/18 09:12   61   


 


 12/09/18 07:32  36.7 C  63  16  118/72  94


 


 12/09/18 04:00  36.8 C  56 L  14  129/69 H  93








 Intake/Output (24 Hrs)











 12/08/18 12/09/18 12/10/18





 05:59 05:59 05:59


 


Intake Total  340 


 


Balance  340 


 


Intake:   


 


  Oral (ml)  340 


 


Other:   


 


  Weight  60.328 kg 


 


  Number of Voids   


 


    Toilet  2 











Result Diagrams: 


 12/08/18 13:00





 12/08/18 13:00


Cardiac Labs: 





 Cardiac Lab Results (72 Hrs)











  12/09/18 12/08/18





  03:18 20:56


 


Troponin I  < 0.012  < 0.012











Telemetry: 





normal sinus rhythm with ST/T wave changes noted








- Physical Exam


Constitutional: WDWN, healthy appearing, no apparent distress


Eyes: PERRL, EOMI


Ears, Nose, Mouth, Throat: moist mucous membranes


Cardiovascular: regular rate and rhythm, no murmurs, no rubs, no gallops


Peripheral Pulses: 2+: dorsalis-pedis (R), dorsalis-pedis (L)


Respiratory: clear to auscultate bilat, no crackles, no wheezes


Gastrointestinal: normoactive bowel sounds


Skin: no rashes, no edema


Musculoskeletal: no muscular tenderness


Neurologic: AAOx3, CN II-XII grossly intact


Psychiatric: cooperative, interactive, following commands





ICD10 Worksheet


Patient Problems: 


 Problems











Problem Status Onset


 


Chest pain Acute

## 2018-12-09 NOTE — HOSPPROG
Hospitalist Progress Note


Assessment/Plan: 





# CAD, LM lesion not amenable to PCI;  hx PCI to LAD 2/2018


   - CT surgery consult


   - cont asa/losartan


Subjective: s/p cath today;  discussed wtih Dr Marin, patient and 


Objective: 


 Vital Signs











Temp Pulse Resp BP Pulse Ox


 


 37.1 C   57 L  16   132/75 H  95 


 


 12/09/18 13:19  12/09/18 13:19  12/09/18 13:19  12/09/18 13:19  12/09/18 13:19








 











 12/08/18 12/09/18 12/10/18





 05:59 05:59 05:59


 


Intake Total  340 200


 


Output Total   0


 


Balance  340 200








 











PT  13.3 SEC (12.0-15.0)   12/08/18  13:00    


 


INR  0.99  (0.83-1.16)   12/08/18  13:00    














- Time Spent With Patient


Time Spent with Patient: greater than 35 minutes


Time Spent with Patient: Greater than 35 minutes spent on this patients care, 

greater than 50% of time spent counseling, educating, and coordinating care 

regarding the above mentioned plan.





- Physical Exam


Constitutional: other (lying flat;  NAD)





ICD10 Worksheet


Patient Problems: 


 Problems











Problem Status Onset


 


Chest pain Acute

## 2018-12-10 RX ADMIN — LOSARTAN POTASSIUM SCH MG: 25 TABLET, FILM COATED ORAL at 08:13

## 2018-12-10 RX ADMIN — ASPIRIN SCH MG: 81 TABLET, DELAYED RELEASE ORAL at 08:13

## 2018-12-10 RX ADMIN — LOSARTAN POTASSIUM SCH MG: 25 TABLET, FILM COATED ORAL at 22:04

## 2018-12-10 RX ADMIN — DOCUSATE SODIUM AND SENNOSIDES SCH TAB: 50; 8.6 TABLET ORAL at 22:04

## 2018-12-10 RX ADMIN — PRAVASTATIN SODIUM SCH MG: 10 TABLET ORAL at 22:04

## 2018-12-10 RX ADMIN — DOCUSATE SODIUM AND SENNOSIDES SCH TAB: 50; 8.6 TABLET ORAL at 09:43

## 2018-12-10 RX ADMIN — POLYETHYLENE GLYCOL 3350 PRN GM: 17 POWDER, FOR SOLUTION ORAL at 21:59

## 2018-12-10 NOTE — PDSURGCRDT
CardioThoracic Surgery Note





- Objective


Objective: 


 Vital Signs











Temp Pulse Resp BP Pulse Ox


 


 36.4 C   63   12   122/82 H  92 


 


 12/10/18 07:31  12/10/18 11:42  12/10/18 11:42  12/10/18 11:42  12/10/18 11:42








 











 12/09/18 12/10/18 12/11/18





 05:59 05:59 05:59


 


Intake Total  1875 


 


Balance  1875 














Exam














Temp Pulse Resp BP Pulse Ox


 


 36.4 C   63   12   122/82 H  92 


 


 12/10/18 07:31  12/10/18 11:42  12/10/18 11:42  12/10/18 11:42  12/10/18 11:42














Text Box





- Additional Text


Additional Text: 





I met with Carmelita Gonzalez today.


We reviewed her coronary anatomy and discussed the indication for CABG.


Risks, benefits alternatives reviewed.


Pt agrees to proceed.  Surgery planned for noon tomorrow.

## 2018-12-10 NOTE — PDCARPN
Cardiology Progress Note


Chief Complaint: 





Patient is doing well today.  CT surgery saw the patient yesterday to discuss 

CABG





Assessment/Plan: 


Assessment:


12-10-18


Patient doing well today.  No cardiovascular complaints.  Pre operative testing 

is being performed, and patient is scheduled for surgery tomorrow.  





12-9-18


Patient is a 69 y/o female with history of HLP (on therapy), HTN, and CAD s/p 

PCI to the proximal LAD in Feb 2018, who presents to [facility] ER with complaints of 

chest discomfort with ambulation.  Patient had been noting some degree of "

chest burning" over the past several weeks, but not to a degree that was 

overtly concerning to the patient.  Her son-in-law is a nurse in [place] 

[place], and he had voiced some concerns about the symptoms given her history.

  On Friday, while walking rapidly up hill to [place] for a [place], 

the patient noted severe substernal chest pains to the degree that she had to 

stop walking several times.  No radiation into shoulder, neck, or jaw.  Similar 

location to symptoms that were noted in Feb 2018 prior to LAD PCI, but more 

severe this time.  Compliance with medical therapy has been good, but Effient 

was stopped after 6 months given (a) pending surgery and (b) severe bruising.  

Ongoing use of ARB, statin, and low dose ASA therapy.  Overnight, no symptoms 

were noted.  No cardiac biomarker elevation has been noted, but ST/T wave 

changes are noted on telemetry (ST depression).  Patient was told that there 

was mild residual proximal LAD stenosis noted (proximal to the PCI) at the 

conclusion of her Feb intervention.  Review of the films with ~20% stenosis 

appreciated.





Plan:


(1)  CT surgery will take over patient care post CABg


(2)  Would arrange for patient to be seen by [facility] 2 weeks post surgery


(3)  Statins should continue in light of the CAD that is noted


(4)  ASA therapy for life is recommended (post surgery)





Subjective: 





No CV complaints





Reviewed/Discussed With: hospitalist


Objective: 





 Vital Signs (8 Hrs)











  Temp Pulse Resp BP Pulse Ox


 


 12/10/18 07:31  36.4 C  61  16  120/70  92


 


 12/10/18 04:00  36.6 C  65  14  110/65  93








 Intake/Output (24 Hrs)











 12/09/18 12/10/18 12/11/18





 05:59 05:59 05:59


 


Intake Total  1875 


 


Balance  1875 


 


Intake:   


 


  Oral (ml)  1875 


 


Other:   


 


  Number of Voids   


 


    Toilet  2 











Result Diagrams: 


 12/08/18 13:00





 12/08/18 13:00


Telemetry: 





normal sinus rhythm with rates of 80-85 bpm (ST/T wave changes are noted)








- Physical Exam


Constitutional: WDWN, healthy appearing, no apparent distress


Eyes: PERRL, EOMI


Ears, Nose, Mouth, Throat: moist mucous membranes


Cardiovascular: regular rate and rhythm, no murmurs, no rubs, no gallops, 

pulses symmetric bilat, No jugular vein distention


Peripheral Pulses: 2+: dorsalis-pedis (R), dorsalis-pedis (L)


Respiratory: clear to auscultate bilat


Gastrointestinal: normoactive bowel sounds


Skin: no rashes, no edema


Musculoskeletal: no muscular tenderness


Neurologic: AAOx3, CN II-XII grossly intact


Psychiatric: cooperative, interactive, following commands





ICD10 Worksheet


Patient Problems: 


 Problems











Problem Status Onset


 


Chest pain Acute

## 2018-12-10 NOTE — ASMTCMCOM
CM Note

 

CM Note                       

Notes:

Patient scheduled for CABG tomorrow. Case Management will follow post-operatively for discharge 

planning. 

 

Date Signed:  12/10/2018 11:51 AM

Electronically Signed By:Sarita Rae RN

## 2018-12-10 NOTE — HOSPPROG
Hospitalist Progress Note


Assessment/Plan: 





# CAD, unstable angina with a LM lesion not amenable to PCI


   - hx PCI to LAD 2/2018


   - plan CABG tomorrow


   - cont asa/losartan


Subjective: no CP overnight


Objective: 


 Vital Signs











Temp Pulse Resp BP Pulse Ox


 


 36.4 C   61   16   120/70   92 


 


 12/10/18 07:31  12/10/18 07:31  12/10/18 07:31  12/10/18 07:31  12/10/18 07:31








 











 12/09/18 12/10/18 12/11/18





 05:59 05:59 05:59


 


Intake Total  1875 


 


Balance  1875 








 











PT  13.3 SEC (12.0-15.0)   12/08/18  13:00    


 


INR  0.99  (0.83-1.16)   12/08/18  13:00    








high risk needing major surgery with unstable angina





- Physical Exam


Constitutional: no apparent distress, appears nourished


Cardiovascular: regular rate and rhythym, no murmur, rub, or gallop


Respiratory: no respiratory distress, no rales or rhonchi, clear to auscultation


Gastrointestinal: normoactive bowel sounds, soft, non-tender abdomen, no 

palpable masses





ICD10 Worksheet


Patient Problems: 


 Problems











Problem Status Onset


 


Chest pain Acute

## 2018-12-11 RX ADMIN — PRAVASTATIN SODIUM SCH: 10 TABLET ORAL at 21:01

## 2018-12-11 RX ADMIN — Medication PRN MCG: at 21:25

## 2018-12-11 RX ADMIN — Medication PRN MCG: at 23:37

## 2018-12-11 RX ADMIN — Medication PRN MCG: at 22:33

## 2018-12-11 RX ADMIN — POTASSIUM CHLORIDE PRN MLS: 400 INJECTION, SOLUTION INTRAVENOUS at 17:35

## 2018-12-11 RX ADMIN — Medication SCH MLS: at 21:00

## 2018-12-11 RX ADMIN — ONDANSETRON PRN MG: 2 SOLUTION INTRAMUSCULAR; INTRAVENOUS at 23:56

## 2018-12-11 RX ADMIN — Medication PRN MCG: at 23:55

## 2018-12-11 RX ADMIN — DOCUSATE SODIUM AND SENNOSIDES SCH: 50; 8.6 TABLET ORAL at 14:09

## 2018-12-11 RX ADMIN — MUPIROCIN SCH APP: 20 OINTMENT TOPICAL at 21:01

## 2018-12-11 RX ADMIN — Medication SCH MLS: at 21:45

## 2018-12-11 RX ADMIN — ALBUMIN (HUMAN) PRN MLS: 2.5 SOLUTION INTRAVENOUS at 18:04

## 2018-12-11 RX ADMIN — LOSARTAN POTASSIUM SCH: 25 TABLET, FILM COATED ORAL at 14:09

## 2018-12-11 RX ADMIN — ALBUMIN (HUMAN) PRN MLS: 2.5 SOLUTION INTRAVENOUS at 18:21

## 2018-12-11 RX ADMIN — Medication PRN MCG: at 21:45

## 2018-12-11 RX ADMIN — ASPIRIN SCH: 81 TABLET, DELAYED RELEASE ORAL at 14:09

## 2018-12-11 RX ADMIN — LOSARTAN POTASSIUM SCH: 25 TABLET, FILM COATED ORAL at 20:24

## 2018-12-11 RX ADMIN — POTASSIUM CHLORIDE PRN MLS: 400 INJECTION, SOLUTION INTRAVENOUS at 18:05

## 2018-12-11 RX ADMIN — Medication PRN MCG: at 20:16

## 2018-12-11 RX ADMIN — CHLORHEXIDINE GLUCONATE SCH: 1.2 RINSE ORAL at 21:01

## 2018-12-11 NOTE — GOP
DATE OF OPERATION:  12/11/2018



SURGEON:  Amarjit Raza MD



ASSISTANT:  Melvin Tamayo P.A.-c.



PREOPERATIVE DIAGNOSIS:  Coronary artery disease with unstable angina.



POSTOPERATIVE DIAGNOSIS:  Coronary artery disease with unstable angina.



PROCEDURE PERFORMED:  

1.  Triple coronary artery bypass grafting.  Summary of grafts:  Left internal mammary artery to the 
left anterior descending, saphenous vein graft from the aorta to D1, saphenous vein graft from aorta 
to M1.

2.  Endoscopic vein harvest from the right leg.



FINDINGS:  The pericardial space was free.  The aorta was soft.  The vein was good quality 4 mm vesse
l.  The mammary was good quality but quite a bit smaller in the 1.25 mm range.  The target vessels we
re adequate.





INDICATIONS:  This patient has a history of coronary artery disease.  She had a stent placed back in 
February in her LAD.  She was initially treated with Effient but discontinued this because of problem
s related to anticoagulation.  Recently, she redeveloped unstable angina and underwent cardiac cathet
erization, which revealed a high-grade left main lesion as well as in-stent restenosis of the LAD.  S
he was recommended to undergo surgical revascularization.



DESCRIPTION OF PROCEDURE:  Patient was taken to the operating room and placed on the operating table 
in supine position.  After the induction of general anesthesia and single-lumen endotracheal tube int
ubation, patient was prepped and draped sterilely.  A standard median sternotomy was performed.  Left
 internal mammary artery was taken down with electrocautery and hemoclips while saphenous vein was ha
rvested from the right leg using a minimally invasive endoscopic technique.  The patient was heparini
zed.  The mammary was divided and found to have good flow.  



Next, the patient was cannulated with a Sarnes 8.0 soft-flow aortic cannula as well as a dual-stage v
enous right atrial cannula.  The cardiopulmonary bypass was instituted.  The distal vessels were melvin
ed for grafting.  Next, a cross-clamp was applied.  The heart was arrested with 1 L of [place] solut
ion.  First, the 1st marginal was dissected up behind near the AV groove.  It was opened and then alfonso
stomosed end-to-side to a vein graft using running 7-0 Prolene.  Next, the 1st diagonal was dissected
, opened, and anastomosed end-to-side to a separate vein graft using running 7-0 Prolene. The 2nd vei
n graft was anastomosed end-to-side to the diagonal, and next the LAD was opened and anastomosed end-
to-side to the left internal mammary artery.  This was tacked to the epicardium and allowed to flow f
reely.  The cross-clamp was removed and a partial occlusion clamp was placed.  The vein grafts were e
ach anastomosed end-to-side to the ascending artery using running 6-0 Prolene.  These were de-aired a
nd allowed to flow freely.  Left, right, and mediastinal chest tubes were placed as well as 2 ventric
ular pacing wires.  The patient was  from bypass without difficulty.  Once the protamine had
 been administered and hemostasis had been achieved, the heart was then covered with pericardium and 
fat, and the chest was closed with #6 stainless steel wires.  Subcutaneous tissue and skin were close
d with running Vicryl suture.  The patient tolerated the procedure well.





Job #:  [id number]

## 2018-12-11 NOTE — PDANEPAE
ANE History of Present Illness





67 yo for cabg





ANE Past Medical History





- Cardiovascular History


Hx Hypertension: Yes


Hx Chest Pain: Yes


Hx Coronary Artery / Peripheral Vascular Disease: Yes





- Pulmonary History


Hx COPD: No


Hx Asthma/Reactive Airway Disease: No


Hx Recent Upper Respiratory Infection: No


Hx Oxygen in Use at Home: No


Hx Sleep Apnea: Yes


Sleep Apnea Screening Result - Last Documented: Positive





- Endocrine History


Hx Diabetes: No





- Chronic Pain History


Chronic Pain: Yes





ANE Review of Systems


Review of Systems: 








- Exercise capacity


METS (RN): 4 METS





ANE Patient History





- Allergies


Allergies/Adverse Reactions: 








codeine Allergy (Mild, Verified 02/06/18 16:15)


 Vomiting


hydroxyzine Allergy (Mild, Verified 12/08/18 15:05)


 Strange Dreams, Unrestful Sleep


adhesive Allergy (Verified 12/08/18 12:48)


 








- Home Medications


Home medications: home medication list seen and reviewed


Home Medications: 








Estradiol [Vivelle-Dot 0.075MG (*)] 0.075 mg TD SuWe@0800 02/06/18 [Last Taken 

12/05/18]


Herbals/Supplements -Info Only 1 ea PO DAILY 02/06/18 [Last Taken Unknown]


Losartan Potassium [Cozaar 25 mg (*)] 25 mg PO BID 02/06/18 [Last Taken 12/08/18

]


Multivitamins [Multivitamin (*)] 1 each PO DAILY 02/06/18 [Last Taken 12/08/18]


Aspirin EC [Aspirin EC 81 mg (*)] 81 mg PO DAILY 12/08/18 [Last Taken 12/08/18]


LORazepam [Ativan (*)] 1 mg PO HS PRN 12/08/18 [Last Taken Unknown]


Pravastatin Sodium [Pravachol] 10 mg PO HS 12/08/18 [Last Taken 12/07/18]


Testosterone 2.5mg Sl Compound 2.5 mg SL EVERY OTHER DAY 12/08/18 [Last Taken 12 /07/18]








- NPO status


NPO Since - Liquids (Date): 12/11/18


NPO Since - Liquids (Time): 00:00


NPO Since - Solids (Date): 12/11/18


NPO Since - Solids (Time): 00:00





- Smoking Hx


Smoking Status: Never smoked





ANE Labs/Vital Signs





- Labs


Result Diagrams: 


 12/08/18 13:00





 12/08/18 13:00





- Vital Signs


Blood Pressure: 128/85


Heart Rate: 62


Respiratory Rate: 16


O2 Sat (%): 98


Height: 5 ft 2 in


Weight: 59.7 kg





ANE Physical Exam





- Airway


Neck exam: FROM


Mallampati Score: Class 2


Mouth exam: normal dental/mouth exam





- Pulmonary


Pulmonary: no respiratory distress





- Cardiovascular


Cardiovascular: regular rate and rhythym





- ASA Status


ASA Status: III





ANE Anesthesia Plan


Anesthesia Plan: general endotracheal anesthesia


Lines/Monitors: arterial line, central line, KADEN

## 2018-12-11 NOTE — POSTOPPROG
Post Op Note


Date of Operation: 12/11/18


Surgeon: Amarjit Raza


Assistant: Brenda Tamayo


Anesthesia: GET(General Endotracheal)


Pre-op Diagnosis: CAD with left main dz


Post-op Diagnosis: same


Procedure: CABGx3 (LIMA-LAD, SVG-OM, SVG-diag)


Findings: See OR report


Inf/Abcess present in the surg proc area at time of surgery?: No


Depth: Organ Space


EBL: 100-500


Complications: 





none


Drains: Other (3 chest tubes y'd)


Specimen(s): 





none

## 2018-12-12 LAB — PLATELET # BLD: 160 10^3/UL (ref 150–400)

## 2018-12-12 RX ADMIN — HYDROCODONE BITARTRATE AND ACETAMINOPHEN PRN TAB: 5; 325 TABLET ORAL at 15:38

## 2018-12-12 RX ADMIN — Medication PRN MCG: at 03:39

## 2018-12-12 RX ADMIN — Medication PRN MCG: at 04:34

## 2018-12-12 RX ADMIN — CHLORHEXIDINE GLUCONATE SCH: 1.2 RINSE ORAL at 07:28

## 2018-12-12 RX ADMIN — PANTOPRAZOLE SODIUM SCH MG: 40 TABLET, DELAYED RELEASE ORAL at 16:35

## 2018-12-12 RX ADMIN — PRAVASTATIN SODIUM SCH MG: 10 TABLET ORAL at 21:29

## 2018-12-12 RX ADMIN — MUPIROCIN SCH APP: 20 OINTMENT TOPICAL at 21:46

## 2018-12-12 RX ADMIN — Medication PRN MCG: at 01:27

## 2018-12-12 RX ADMIN — ESTRADIOL SCH: 0.07 PATCH, EXTENDED RELEASE TRANSDERMAL at 09:01

## 2018-12-12 RX ADMIN — HYDROCODONE BITARTRATE AND ACETAMINOPHEN PRN TAB: 5; 325 TABLET ORAL at 20:07

## 2018-12-12 RX ADMIN — ASPIRIN 81 MG SCH MG: 81 TABLET ORAL at 16:35

## 2018-12-12 RX ADMIN — HYDROCODONE BITARTRATE AND ACETAMINOPHEN PRN TAB: 5; 325 TABLET ORAL at 13:55

## 2018-12-12 RX ADMIN — Medication PRN MCG: at 05:51

## 2018-12-12 RX ADMIN — Medication SCH MLS: at 05:57

## 2018-12-12 RX ADMIN — Medication SCH MLS: at 09:01

## 2018-12-12 RX ADMIN — LOSARTAN POTASSIUM SCH: 25 TABLET, FILM COATED ORAL at 07:30

## 2018-12-12 RX ADMIN — POLYETHYLENE GLYCOL 3350 PRN GM: 17 POWDER, FOR SOLUTION ORAL at 21:28

## 2018-12-12 RX ADMIN — Medication PRN MCG: at 02:30

## 2018-12-12 RX ADMIN — HYDROCODONE BITARTRATE AND ACETAMINOPHEN PRN TAB: 5; 325 TABLET ORAL at 09:55

## 2018-12-12 RX ADMIN — Medication SCH MLS: at 14:34

## 2018-12-12 RX ADMIN — MUPIROCIN SCH APP: 20 OINTMENT TOPICAL at 08:53

## 2018-12-12 RX ADMIN — Medication SCH MLS: at 21:30

## 2018-12-12 NOTE — SOAPPROG
SOAP Progress Note


Assessment/Plan: 


POD #1: CABGx3 (LIMA-LAD, SVG-D1, SVG-OM1), EVH right 





CAD/unstable angina with normal LVEF s/p CABGx3


- BB/ASA/statin when appropraite


- AL/FC out, CTs to suction 


- Transfer to PCU





Acute post-op blood loss anemia 


- Stable without the need for transfusions 


Subjective: 


Pt c/o chest pain when talking and breathing. Denies SOB. 


Objective: 





 Vital Signs











Temp Pulse Resp BP Pulse Ox


 


 36.4 C   68   15   102/57 L  99 


 


 12/12/18 00:00  12/12/18 06:00  12/12/18 06:00  12/12/18 06:00  12/12/18 06:00








 Laboratory Results





 12/12/18 03:30 





 12/12/18 03:30 





 











 12/11/18 12/12/18 12/13/18





 05:59 05:59 05:59


 


Intake Total 1400 2451.2 


 


Output Total  1330 


 


Balance 1400 1121.2 








 











PT  13.3 SEC (12.0-15.0)   12/08/18  13:00    


 


INR  0.99  (0.83-1.16)   12/08/18  13:00    














Physical Exam





- Physical Exam


General Appearance: WD/WN, alert, no apparent distress


EENT: No scleral icterus (R), No scleral icterus (L)


Neck: normal inspection


Respiratory: No respiratory distress


Cardiac/Chest: regular rate, rhythm


Abdomen: non-tender, soft, No distended


Skin: normal color, warm/dry


Extremities: No pedal edema


Neuro/Psych: no motor/sensory deficits, alert, normal mood/affect, oriented x 3





ICD10 Worksheet


Patient Problems: 


 Problems











Problem Status Onset


 


Acute blood loss anemia Acute  


 


Chest pain Acute  


 


Chronic Disease Mgmt/Transitional Care Acute  


 


Coronary artery disease Acute  


 


S/P CABG x 3 Acute

## 2018-12-13 LAB — PLATELET # BLD: 169 10^3/UL (ref 150–400)

## 2018-12-13 RX ADMIN — DOCUSATE SODIUM AND SENNOSIDES SCH TAB: 50; 8.6 TABLET ORAL at 09:34

## 2018-12-13 RX ADMIN — ASPIRIN 81 MG SCH MG: 81 TABLET ORAL at 09:35

## 2018-12-13 RX ADMIN — ACETAMINOPHEN PRN MG: 325 TABLET ORAL at 09:40

## 2018-12-13 RX ADMIN — DOCUSATE SODIUM AND SENNOSIDES SCH TAB: 50; 8.6 TABLET ORAL at 20:17

## 2018-12-13 RX ADMIN — HYDROCODONE BITARTRATE AND ACETAMINOPHEN PRN TAB: 5; 325 TABLET ORAL at 02:18

## 2018-12-13 RX ADMIN — PRAVASTATIN SODIUM SCH MG: 10 TABLET ORAL at 20:17

## 2018-12-13 RX ADMIN — MUPIROCIN SCH APP: 20 OINTMENT TOPICAL at 09:35

## 2018-12-13 RX ADMIN — PANTOPRAZOLE SODIUM SCH MG: 40 TABLET, DELAYED RELEASE ORAL at 09:35

## 2018-12-13 RX ADMIN — ACETAMINOPHEN PRN MG: 325 TABLET ORAL at 18:19

## 2018-12-13 RX ADMIN — ONDANSETRON PRN MG: 2 SOLUTION INTRAMUSCULAR; INTRAVENOUS at 20:42

## 2018-12-13 RX ADMIN — POLYETHYLENE GLYCOL 3350 PRN GM: 17 POWDER, FOR SOLUTION ORAL at 20:16

## 2018-12-13 RX ADMIN — Medication SCH MLS: at 06:57

## 2018-12-13 NOTE — ASMTCMCOM
CM Note

 

CM Note                       

Notes:

12/13/2018 Case Management Note



Discussed pt with FLAQUITO Pascual.  Anticipating d/c on Saturday.  PT is recommending cardiac 

outpatient rehab.



Case Management d/c poc:  Independent with cardiac outpatient rehab.



Case Management available if needs change. 

 

Date Signed:  12/13/2018 10:06 AM

Electronically Signed By:Ines Barrientos RN

## 2018-12-13 NOTE — SOAPPROG
SOAP Progress Note


Assessment/Plan: 


POD #2: CABGx3 (LIMA-LAD, SVG-D1, SVG-OM1), EVH right 





CAD/unstable angina with normal LVEF s/p CABGx3


- BB/ASA/statin when appropriate


- CTs to be removed to today





Acute post-op blood loss anemia 


- Stable without the need for transfusions 





DVT prophylaxis 


- SCDs/heparin SQ





Disposition 


- Plan for home without services Saturday/Sunday








Subjective: 


Still c/o sharp chest pain when taking deep breaths. Happy for tubes to be 

removed today. 


Objective: 





 Vital Signs











Temp Pulse Resp BP Pulse Ox


 


 36.9 C   71   20   102/61   96 


 


 12/13/18 07:15  12/13/18 07:15  12/13/18 07:15  12/13/18 07:15  12/13/18 07:15








 Laboratory Results





 12/13/18 06:10 





 











 12/12/18 12/13/18 12/14/18





 05:59 05:59 05:59


 


Intake Total 2451.2 1445 


 


Output Total 1330 1320 


 


Balance 1121.2 125 








 











PT  13.3 SEC (12.0-15.0)   12/08/18  13:00    


 


INR  0.99  (0.83-1.16)   12/08/18  13:00    














Physical Exam





- Physical Exam


General Appearance: WD/WN, alert, no apparent distress


EENT: No scleral icterus (R), No scleral icterus (L)


Neck: normal inspection


Respiratory: No respiratory distress


Cardiac/Chest: regular rate, rhythm


Abdomen: non-tender, soft, No distended


Skin: normal color, warm/dry


Extremities: No pedal edema


Neuro/Psych: no motor/sensory deficits, alert, normal mood/affect, oriented x 3





ICD10 Worksheet


Patient Problems: 


 Problems











Problem Status Onset


 


Acute blood loss anemia Acute  


 


Chest pain Acute  


 


Chronic Disease Mgmt/Transitional Care Acute  


 


Coronary artery disease Acute  


 


S/P CABG x 3 Acute

## 2018-12-14 RX ADMIN — HYDROCODONE BITARTRATE AND ACETAMINOPHEN PRN TAB: 5; 325 TABLET ORAL at 08:35

## 2018-12-14 RX ADMIN — METOPROLOL TARTRATE SCH MG: 25 TABLET, FILM COATED ORAL at 20:32

## 2018-12-14 RX ADMIN — HYDROCODONE BITARTRATE AND ACETAMINOPHEN PRN TAB: 5; 325 TABLET ORAL at 22:43

## 2018-12-14 RX ADMIN — DOCUSATE SODIUM AND SENNOSIDES SCH TAB: 50; 8.6 TABLET ORAL at 20:31

## 2018-12-14 RX ADMIN — PRAVASTATIN SODIUM SCH MG: 10 TABLET ORAL at 20:32

## 2018-12-14 RX ADMIN — ONDANSETRON PRN MG: 2 SOLUTION INTRAMUSCULAR; INTRAVENOUS at 00:01

## 2018-12-14 RX ADMIN — HYDROCODONE BITARTRATE AND ACETAMINOPHEN PRN TAB: 5; 325 TABLET ORAL at 15:37

## 2018-12-14 RX ADMIN — PANTOPRAZOLE SODIUM SCH MG: 40 TABLET, DELAYED RELEASE ORAL at 11:45

## 2018-12-14 RX ADMIN — ONDANSETRON PRN MG: 2 SOLUTION INTRAMUSCULAR; INTRAVENOUS at 06:07

## 2018-12-14 RX ADMIN — ACETAMINOPHEN PRN MG: 325 TABLET ORAL at 00:25

## 2018-12-14 RX ADMIN — DOCUSATE SODIUM AND SENNOSIDES SCH TAB: 50; 8.6 TABLET ORAL at 11:45

## 2018-12-14 RX ADMIN — ASPIRIN 81 MG SCH MG: 81 TABLET ORAL at 11:45

## 2018-12-14 RX ADMIN — METOPROLOL TARTRATE SCH MG: 25 TABLET, FILM COATED ORAL at 11:41

## 2018-12-14 RX ADMIN — POLYETHYLENE GLYCOL 3350 PRN GM: 17 POWDER, FOR SOLUTION ORAL at 20:31

## 2018-12-14 NOTE — SOAPPROG
SOAP Progress Note


Assessment/Plan: 


Assessment: POD#3 CABGx3 (LIMA-LAD, SVG-D1, SVG-OM1), EVH right leg 





Sx CAD with normal LVEF - Fully revascularized with CABG. Stable early postop 

course. CTs out. Secondary prevention with baby ASA, BB and statin.





Postoperative PAF - Responsive to amiodarone. Sufficient BP to add BB. 

Antithrombotic prophylaxis w DOAC if recurrent or persistent.





Acute expected blood loss anemia - Stable. No transfusions needed. VTE 

prophylaxis with SCDs/heparin SQ.














Plan:


Keep TCPWs one more day.


Transition to oral amio tonight. Trial scopolamine patch for nausea.


Start low dose metoprolol.


Inc activity as tolerated.


Wean O2.


Home bowel regimen.


Dispo - Home without services next 1-2 days.








12/14/18 07:57

















Subjective: 





Miserable night, nauseous on amio. A little better this am. Now just tired.


Objective: 





 Vital Signs











Temp Pulse Resp BP Pulse Ox


 


 36.9 C   65   16   122/76 H  95 


 


 12/14/18 04:00  12/14/18 04:00  12/14/18 04:00  12/14/18 04:00  12/14/18 04:00








 Laboratory Results





 12/13/18 06:10 





 12/13/18 06:10 





 











 12/13/18 12/14/18 12/15/18





 05:59 05:59 05:59


 


Intake Total 1445 1150 


 


Output Total 1320 1020 


 


Balance 125 130 








 











PT  13.3 SEC (12.0-15.0)   12/08/18  13:00    


 


INR  0.99  (0.83-1.16)   12/08/18  13:00    








SR restored before MN. 


Stable SBPs.


Borderline suppl O2 req.


Balanced I/Os. +5 kg overall.








- Pending Discharge


Pending Discharge Within 48 Hours: Yes


Pending Discharge Date: 12/16/18


Pending Discharge Time: 11:00





Physical Exam





- Physical Exam


General Appearance: alert, no apparent distress


Respiratory: decreased breath sounds (bases), other (chest tube sites moist)


Cardiac/Chest: regular rate, rhythm, other (Sternotomy and RLE venotomy CDI)


Abdomen: non-tender, soft


Skin: warm/dry


Extremities: swelling (trace dependent)





ICD10 Worksheet


Patient Problems: 


 Problems











Problem Status Onset


 


Acute blood loss anemia Acute  


 


Chest pain Acute  


 


Chronic Disease Mgmt/Transitional Care Acute  


 


Coronary artery disease Acute  


 


S/P CABG x 3 Acute

## 2018-12-15 RX ADMIN — DILTIAZEM HYDROCHLORIDE SCH MG: 30 TABLET, FILM COATED ORAL at 15:52

## 2018-12-15 RX ADMIN — APIXABAN SCH MG: 5 TABLET, FILM COATED ORAL at 10:43

## 2018-12-15 RX ADMIN — DILTIAZEM HYDROCHLORIDE SCH MG: 30 TABLET, FILM COATED ORAL at 23:16

## 2018-12-15 RX ADMIN — APIXABAN SCH MG: 5 TABLET, FILM COATED ORAL at 21:04

## 2018-12-15 RX ADMIN — METOPROLOL TARTRATE SCH MG: 1 INJECTION, SOLUTION INTRAVENOUS at 11:36

## 2018-12-15 RX ADMIN — HYDROCODONE BITARTRATE AND ACETAMINOPHEN PRN TAB: 5; 325 TABLET ORAL at 04:45

## 2018-12-15 RX ADMIN — HYDROCODONE BITARTRATE AND ACETAMINOPHEN PRN TAB: 5; 325 TABLET ORAL at 23:15

## 2018-12-15 RX ADMIN — METOPROLOL TARTRATE SCH MG: 25 TABLET, FILM COATED ORAL at 08:57

## 2018-12-15 RX ADMIN — ASPIRIN 81 MG SCH MG: 81 TABLET ORAL at 08:57

## 2018-12-15 RX ADMIN — PANTOPRAZOLE SODIUM SCH MG: 40 TABLET, DELAYED RELEASE ORAL at 08:57

## 2018-12-15 RX ADMIN — PRAVASTATIN SODIUM SCH MG: 10 TABLET ORAL at 21:04

## 2018-12-15 RX ADMIN — DOCUSATE SODIUM AND SENNOSIDES SCH TAB: 50; 8.6 TABLET ORAL at 21:04

## 2018-12-15 RX ADMIN — POLYETHYLENE GLYCOL 3350 PRN GM: 17 POWDER, FOR SOLUTION ORAL at 21:04

## 2018-12-15 RX ADMIN — METOPROLOL TARTRATE SCH MG: 1 INJECTION, SOLUTION INTRAVENOUS at 11:20

## 2018-12-15 RX ADMIN — DOCUSATE SODIUM AND SENNOSIDES SCH TAB: 50; 8.6 TABLET ORAL at 08:57

## 2018-12-15 RX ADMIN — ONDANSETRON PRN MG: 2 SOLUTION INTRAMUSCULAR; INTRAVENOUS at 17:48

## 2018-12-15 RX ADMIN — HYDROCODONE BITARTRATE AND ACETAMINOPHEN PRN TAB: 5; 325 TABLET ORAL at 07:59

## 2018-12-15 RX ADMIN — METOPROLOL TARTRATE SCH MG: 1 INJECTION, SOLUTION INTRAVENOUS at 11:59

## 2018-12-15 NOTE — SOAPPROG
SOAP Progress Note


Assessment/Plan: 


Assessment: POD#4 CABGx3 (LIMA-LAD, SVG-D1, SVG-OM1), EVH right leg 





Sx CAD with normal LVEF - Fully revascularized with CABG. Stable early postop 

course. CTs out. Secondary prevention with baby ASA and statin. Low dose BB at 

12.5 mg PO BID started yesterday. BP dropped 30 mmHg (120->90 from 9pm dose). 

Intravascularly hypovolemic.





Postoperative PAF (2 episodes)- Afib with RVR in 130's last night. Lopressor 5 

mg IV given once without resolution and remains in atrial fibrillation. 

Although responsive to amiodarone gtt-> significant nausea and subsequently dc'

d. ROSA not excluded. Antithrombotic prophylaxis w DOAC today. 





Acute expected blood loss anemia - Stable. No transfusions needed. VTE 

prophylaxis with SCDs, ambulation.








Plan:


Fluid challenge 500cc NS x 1 now


Inc BB to 25 mg PO BID


Eliquis 5 mg PO BID 


Keep TCPWs one more day


Wean O2








Dispo - Home without services possibly tomorrow if afib controlled (either 

converts vs. rate-controlled on DOAC)

















Objective: 





 Vital Signs











Temp Pulse Resp BP Pulse Ox


 


 36.8 C   118 H  20   92/64 L  96 


 


 12/15/18 07:52  12/15/18 07:52  12/15/18 07:52  12/15/18 07:52  12/15/18 07:52








 Laboratory Results





 12/13/18 06:10 





 12/13/18 06:10 





 











 12/14/18 12/15/18 12/16/18





 05:59 05:59 05:59


 


Intake Total 1150 1330 


 


Output Total 1020 2065 


 


Balance 130 -735 








 











PT  13.3 SEC (12.0-15.0)   12/08/18  13:00    


 


INR  0.99  (0.83-1.16)   12/08/18  13:00    








General: NAD, sitting upright


HEENT: CVL IJ, MMM


Respiratory: nasal cannula oxygen at 1L, no wheezes, crackles


Cardiac: afib, no m/r/g, 1+ edema


GI: soft, nt, nd


Extremities:warm, dry


Incisions: sternum/right thigh - CDI














ICD10 Worksheet


Patient Problems: 


 Problems











Problem Status Onset


 


Acute blood loss anemia Acute  


 


Chest pain Acute  


 


Chronic Disease [facility]/Transitional Care Acute  


 


Coronary artery disease Acute  


 


S/P CABG x 3 Acute

## 2018-12-16 VITALS — DIASTOLIC BLOOD PRESSURE: 56 MMHG | SYSTOLIC BLOOD PRESSURE: 96 MMHG

## 2018-12-16 RX ADMIN — DOCUSATE SODIUM AND SENNOSIDES SCH TAB: 50; 8.6 TABLET ORAL at 09:03

## 2018-12-16 RX ADMIN — PANTOPRAZOLE SODIUM SCH MG: 40 TABLET, DELAYED RELEASE ORAL at 09:03

## 2018-12-16 RX ADMIN — HYDROCODONE BITARTRATE AND ACETAMINOPHEN PRN TAB: 5; 325 TABLET ORAL at 09:04

## 2018-12-16 RX ADMIN — ASPIRIN 81 MG SCH MG: 81 TABLET ORAL at 09:04

## 2018-12-16 RX ADMIN — APIXABAN SCH MG: 5 TABLET, FILM COATED ORAL at 09:04

## 2018-12-16 NOTE — PDDCSUM
Discharge Summary


Discharge Summary: 





DATE OF ADMISSION: 12/8/18





DATE OF DISCHARGE: 12/16/18





DISPOSITION: Home with Home Oxygen





ACTIVITY:  Instructed on sternal precautions, activity restrictions, and 

problems to call [facility]. 





ADMISSION DIAGNOSES: 


Unstable angina pectoris with left main and proximal left anterior descending in

-stent stenosis


Status post recent bladder suspension


Chronic low back pain





DISCHARGE DIAGNOSES:


As above plus,


Acute blood loss anemia


Post-operative atrial fibrillation x 2


Severe nausea secondary to amiodarone use





PROCEDURE PERFORMED: 


12/11/18 ([facility]) CABGx3 (LIMA-LAD, SVG-D1, SVG-M1), EVH right thigh





HISTORY OF PRESENT ILLNESS:


This is a 68F very active lady who underwent stenting to her LAD in February. 

She had recurrence of symptoms with severe chest pain the night before 

admission and was admitted. Diagnostic cath demonstrated high grade ostial LAD 

and LM stenosis with a normal LV function.





HOSPITAL COURSE BY PROBLEM LIST:


Symptomatic CAD with normal LVEF - Required dobutamine and aggressive IVF the 

first night in the ICU. She was transferred to the PCU on POD#1. She was 

started on ASA and a statin.





Postoperative PAF x 2- First episode was POD#2. She converted with amiodarone, 

however, she developed severe nausea and poor appetite even with multiple anti-

nausea medications. Amiodarone was discontinued. She again developed atrial 

fibrillation on POD#4 and was asymptomatic. She converted to NSR after 

Lopressor 5 mg IV x3 and PO Diltiazem. Antithrombotic prophylaxis with Eliquis 

for 30 days since ROSA was not excluded. D/w Dr. Perea regarding optimal therapy 

for afib ppx - Lopressor 25 mg PO TID and to discontinue Diltiazem.





Acute expected blood loss anemia - Stable. No transfusions needed. 





PERTINENT DISCHARGE CLINICAL INFORMATION:


Sternotomy stable, CDI


EVH Site, CDI


HR 60 BP 96/56 SpO2 97% 1.5 L  pre-op wt 60 kg discharge wt 64 kg


WBC 20 Hgb 8.4  Hct 25.3 Plt 169  Na 137 K 4.7  Cr 0.6  





CONSULTANTS: [facility]





MEDICATIONS ON ADMISSION:


Losartan 25 mg PO BID


Pravastatin 10 mg PO HS


ASA 81 PO daily


Multivitamin


Herbal Supplements


Estradiol/Testosterone


Ativan PRN





ALLERGIES/SENSITIVITIES: codeine, hydroxyzine, adhesive 





DISCHARGE MEDICATIONS:


STOP these medications:


Losartan 25 mg PO BID





CONTINUE these medications:


Pravastatin 10 mg PO HS


ASA 81 PO daily


Multivitamin


Herbal Supplements


Estradiol/Testosterone 


Ativan PRN





NEW medications:


Eliquis 5 mg PO BID #30 tabs for minimum 30 days


Lopressor 25 mg PO TID #90 tabs, 1 refill


Lasix 20 mg PO daily #30 tabs until back to pre-op weight


Klor-Con 10 mEq PO daily #30tabs until back to pre-op weight


Tramadol  mg PO [id number] PRN #30 tabs


APAP OTC





FOLLOW UP APPOINTMENTS:


1. CV surgery: with Dr. Wynn at [facility] on 12/28/18 @ 11:15.


2. Cardiology: with Dr. Negrete ([facility]) within 4-6 weeks. Appointment to be 

established during surgical visit.





FOLLOW UP TESTING:


CXR prior to surgical appointment.

## 2018-12-16 NOTE — SOAPPROG
SOAP Progress Note


Assessment/Plan: 


Assessment: POD#5 CABGx3 (LIMA-LAD, SVG-D1, SVG-OM1), EVH right leg 





Sx CAD with normal LVEF - Fully revascularized with CABG. Stable early postop 

course. CTs out. Secondary prevention with baby ASA and statin. 





Postoperative PAF (2 episodes)- Converted to NSR after Lopressor 5 mg IV x3. 

Started on Cardizem as well. BP stable. Although responsive to amiodarone gtt-> 

significant nausea and subsequently dc'd. ROSA not excluded. Antithrombotic 

prophylaxis w DOAC. 





Acute expected blood loss anemia - Stable. No transfusions needed. VTE 

prophylaxis with SCDs, ambulation.








Plan:


D/w Tianna Vega


Dc Cardizem and change to Lopressor 25 mg PO TID


Cont Eliquis 5 mg PO BID x 30 days


TCPWs out today


Wean O2 if possible, likely will need Home Oxygen


Lasix 20 mg PO daily for volume overload, will be sent home on lasix as well








Dispo - Home without services today











Subjective: 





Converted to NSR yesterday. Appetite improved.


Objective: 





 Vital Signs











Temp Pulse Resp BP Pulse Ox


 


 37.5 C   61   12   113/66   96 


 


 12/16/18 07:55  12/16/18 07:55  12/16/18 07:55  12/16/18 07:55  12/16/18 07:55








 Laboratory Results





 12/13/18 06:10 





 12/13/18 06:10 





 











 12/15/18 12/16/18 12/17/18





 05:59 05:59 05:59


 


Intake Total 1330 2000 


 


Output Total 2065 1500 


 


Balance -735 500 








 











PT  13.3 SEC (12.0-15.0)   12/08/18  13:00    


 


INR  0.99  (0.83-1.16)   12/08/18  13:00    








General: NAD, sitting upright


HEENT: CVL IJ, MMM


Respiratory: nasal cannula oxygen at 1L, no wheezes, crackles


Cardiac: NSR, no m/r/g, 1+ edema


GI: soft, nt, nd


Extremities:warm, dry


Incisions: sternum/right thigh - CDI





ICD10 Worksheet


Patient Problems: 


 Problems











Problem Status Onset


 


Acute blood loss anemia Acute  


 


Chest pain Acute  


 


Chronic Disease Mgmt/Transitional Care Acute  


 


Coronary artery disease Acute  


 


S/P CABG x 3 Acute

## 2018-12-16 NOTE — PDHOMEO2F
Home Oxygen Face to Face


Home Orders: 


I certify that a physician or a nurse practitioner or physician's assistant has 

had a face-to-face encounter with this patient on the date of this order due to 

the diagnosis listed, which relates to the primary reason the patient requires 

home oxygen. Alternative treatments have been tried, or considered, and deemed 

ineffective. It is anticipated that supplemental oxygen will result in 

improvement with treatment.





Home oxygen qualifying diagnosis: CAD s/p CABGx3


SpO2 on room air (%): 85


Frequency of home oxygen needed: continuous


Home oxygen liters per minute: 1 LPM


Home oxygen delivery device: nasal cannula


Concentrator: Yes


E-tanks for mobility and back up: Yes


If ordering portable O2, is the patient mobile in the home?: Yes


I certify that, based on these findings, the home oxygen is medically necessary 

for this patient for the following length of time.





Length of time home oxygen needed: 1 month

## 2018-12-28 ENCOUNTER — HOSPITAL ENCOUNTER (OUTPATIENT)
Dept: HOSPITAL 80 - FIMAGING | Age: 68
End: 2018-12-28
Attending: THORACIC SURGERY (CARDIOTHORACIC VASCULAR SURGERY)
Payer: COMMERCIAL

## 2018-12-28 DIAGNOSIS — Z95.1: ICD-10-CM

## 2018-12-28 DIAGNOSIS — R91.8: ICD-10-CM

## 2018-12-28 DIAGNOSIS — J90: Primary | ICD-10-CM

## 2022-03-13 NOTE — PDCONSULT
Consultant Note: 





Primary care provider:  Dr. Sandy Waggoner





HPI:  67-year-old female presenting with acute chest pain characterized as 

burning sensation located in the upper anterior chest with onset of symptoms 

approximately 2 weeks ago, exacerbated with exertion, alleviated with rest.  

Patient reports that the symptoms have become particular noticeable during her 

weekend ski trips, and they have consistently occurred every time she exerts 

herself.  The patient recently became concerned when she experienced the chest 

discomfort with very minimal ambulatory exertion, on the morning prior to this 

presentation.  She went to see her primary care provider, had cardiac enzymes, D

-dimer drawn, and was sent to the emergency department.  She reports the 

discomfort is somewhat different than what she was experiencing 7 years ago 

when she had a workup for chest discomfort, consisting of a false positive EKG 

stress test, a reportedly normal nuclear medicine stress test, possible ASD on 

echocardiogram, an esophagram demonstrating some upper esophagus retention but 

no overt aspiration.  She has not been taking any medications for acid reflux, 

and she takes ibuprofen on a daily basis for musculoskeletal aches and pains.





Review of systems:  Positive for chest discomfort, all other 10 point review 

systems is otherwise negative





Past medical history:  Previous history of chest discomfort approximately 7 

years ago, unclear cause, palpitations 2 years ago, of abated without workup, 

chronic lower back pain





Past surgical history:  None, has never had a cardiac catheterization





Social history:  Patient smoked when she was in college, but has not smoked in 

many decades, she drinks 1-2 glasses of wine nightly, has never experienced 

alcohol withdrawal, she denies any other drugs, she is very physically active 

and has ski trip booked for the rest of the year every weekend





Family history:  Mother had a permanent pacemaker 70s, father had coronary 

artery disease in his 80s, no premature coronary disease





Allergies:  Adhesive tape produces hives, codeine produces vomiting, this is 

not a true allergy





Physical exam:  Systolic blood pressure 176, heart rate 58, temperature 37.0 

degrees, SpO2 97% on room air, respiratory rate 16


General-no apparent distress, well-appearing woman, not obese


Neuro-moving all 4 extremities, facial symmetry, alert awake oriented x3


Psych-no anxiety, no agitation, not encephalopathic


HEENT-anicteric sclera, extraocular muscles intact


Skin-no vesicular lesions or rash over the anterior chest


Musculoskeletal-full range of motion bilateral shoulders without any pain, no 

tenderness to palpation over the anterior pectoralis muscles, full range of 

motion the neck without any pain


Respiratory-lungs are clear to auscultation bilaterally, no crackles no wheezes


Cardiac-regular rate and rhythm, no murmurs rubs or gallops appreciated, pulses 

are 2+ bilaterally


Gastrointestinal-bowel sounds are present, abdomen is soft nontender 

nondistended no masses palpated





Data:  D-dimer negative, troponin negative, potassium 4.2, creatinine 0.8, 

white blood count 6800, hemoglobin 13.7


Imaging-normal chest x-ray, personally interpreted, EKG demonstrating normal 

sinus mechanism with a PC, personally interpreted


Outside records-2011 esophagram demonstrating normal swallow, small retention 

the posterior pharynx, 8/7/2017 mammogram normal, read by Scott Loco





Assessment:  67-year-old female presents with acute exertional chest pain





Plan:





1.  Chest pain.  Acute, new problem this provider, further workup indicated.  

Potentially stable angina, with recent symptoms in the setting of very minimal 

exertion, raising the risk of worsening, warranting urgent evaluation


-given patient's history of false positive EKG treadmill, will get nuclear 

medicine treadmill in the morning


-initial troponin negative, does not require additional troponin measurement at 

this time, given that her last symptom was yesterday morning


-educated the patient her  if she experiences any recurrent symptoms 

overnight, she should notify the nurse immediately and we will draw cardiac 

enzymes and repeat EKG at that time


-check lipid panel and hemoglobin A1c


-monitor on telemetry for any potential arrhythmias which could be the 

potentiating cause of her discomfort


-make NPO in a.m. for stress test, cardiac catheterization if stress test 

positive next-if stress test negative, hospital Medicine rounder to address non 

cardiopulmonary causes of chest discomfort in a.m.





2.  Chronic lower back pain. Hold on use of NSAIDs overnight given potential 

for coronary artery disease, continue as needed Tylenol





3.  Health maintenance screening.  Patient and her  have requested 

hepatitis C screening given that the patient has not had 1 performed in the past

, this is appropriate given her age cohort





Diet.  Regular, NPO after midnight next prophylaxis.  High risk patient, 

Lovenox 40





Code.  Full





Disposition.  Anticipated discharge is 2/7, pending further workup as outlined 

above.





I have discussed patient's presentation with Dr. Laughlin McCollester in the 

emergency department, we both agree that the patient warrants urgent cardiac 

workup with stress testing in a.m. 1 pair